# Patient Record
Sex: FEMALE | Race: WHITE | Employment: UNEMPLOYED | URBAN - METROPOLITAN AREA
[De-identification: names, ages, dates, MRNs, and addresses within clinical notes are randomized per-mention and may not be internally consistent; named-entity substitution may affect disease eponyms.]

---

## 2022-01-12 ENCOUNTER — HOSPITAL ENCOUNTER (INPATIENT)
Age: 72
LOS: 1 days | Discharge: HOME | DRG: 247 | End: 2022-01-13
Attending: EMERGENCY MEDICINE | Admitting: INTERNAL MEDICINE
Payer: MEDICARE

## 2022-01-12 ENCOUNTER — APPOINTMENT (OUTPATIENT)
Dept: GENERAL RADIOLOGY | Age: 72
DRG: 247 | End: 2022-01-12
Payer: MEDICARE

## 2022-01-12 DIAGNOSIS — F29 PSYCHOSIS, UNSPECIFIED PSYCHOSIS TYPE (HCC): ICD-10-CM

## 2022-01-12 DIAGNOSIS — I21.11 ST ELEVATION MYOCARDIAL INFARCTION INVOLVING RIGHT CORONARY ARTERY (HCC): Primary | ICD-10-CM

## 2022-01-12 DIAGNOSIS — U07.1 COVID-19: ICD-10-CM

## 2022-01-12 LAB
ALBUMIN SERPL-MCNC: 3.4 G/DL (ref 3.5–4.6)
ALP BLD-CCNC: 70 U/L (ref 40–130)
ALT SERPL-CCNC: 29 U/L (ref 0–33)
ANION GAP SERPL CALCULATED.3IONS-SCNC: 11 MEQ/L (ref 9–15)
AST SERPL-CCNC: 37 U/L (ref 0–35)
BASOPHILS ABSOLUTE: 0.1 K/UL (ref 0–0.2)
BASOPHILS RELATIVE PERCENT: 0.5 %
BILIRUB SERPL-MCNC: 0.5 MG/DL (ref 0.2–0.7)
BUN BLDV-MCNC: 15 MG/DL (ref 8–23)
CALCIUM SERPL-MCNC: 8.6 MG/DL (ref 8.5–9.9)
CHLORIDE BLD-SCNC: 102 MEQ/L (ref 95–107)
CHOLESTEROL, TOTAL: 155 MG/DL (ref 0–199)
CO2: 23 MEQ/L (ref 20–31)
CREAT SERPL-MCNC: 0.71 MG/DL (ref 0.5–0.9)
EOSINOPHILS ABSOLUTE: 0.5 K/UL (ref 0–0.7)
EOSINOPHILS RELATIVE PERCENT: 3.2 %
GFR AFRICAN AMERICAN: >60
GFR AFRICAN AMERICAN: >60
GFR NON-AFRICAN AMERICAN: >60
GFR NON-AFRICAN AMERICAN: >60
GLOBULIN: 3 G/DL (ref 2.3–3.5)
GLUCOSE BLD-MCNC: 126 MG/DL (ref 70–99)
HCT VFR BLD CALC: 38.7 % (ref 37–47)
HDLC SERPL-MCNC: 42 MG/DL (ref 40–59)
HEMOGLOBIN: 12.8 G/DL (ref 12–16)
INR BLD: 1.1
LDL CHOLESTEROL CALCULATED: 86 MG/DL (ref 0–129)
LV EF: 55 %
LVEF MODALITY: NORMAL
LYMPHOCYTES ABSOLUTE: 2.5 K/UL (ref 1–4.8)
LYMPHOCYTES RELATIVE PERCENT: 15.9 %
MCH RBC QN AUTO: 32 PG (ref 27–31.3)
MCHC RBC AUTO-ENTMCNC: 33 % (ref 33–37)
MCV RBC AUTO: 96.9 FL (ref 82–100)
MONOCYTES ABSOLUTE: 1.2 K/UL (ref 0.2–0.8)
MONOCYTES RELATIVE PERCENT: 7.8 %
NEUTROPHILS ABSOLUTE: 11.6 K/UL (ref 1.4–6.5)
NEUTROPHILS RELATIVE PERCENT: 72.6 %
PDW BLD-RTO: 13.4 % (ref 11.5–14.5)
PERFORMED ON: NORMAL
PLATELET # BLD: 362 K/UL (ref 130–400)
POC CREATININE WHOLE BLOOD: 0.8
POC CREATININE: 0.8 MG/DL (ref 0.6–1.2)
POC SAMPLE TYPE: NORMAL
POTASSIUM SERPL-SCNC: 4.3 MEQ/L (ref 3.4–4.9)
PROTHROMBIN TIME: 14.5 SEC (ref 12.3–14.9)
RBC # BLD: 4 M/UL (ref 4.2–5.4)
SARS-COV-2, NAAT: DETECTED
SODIUM BLD-SCNC: 136 MEQ/L (ref 135–144)
TOTAL PROTEIN: 6.4 G/DL (ref 6.3–8)
TRIGL SERPL-MCNC: 134 MG/DL (ref 0–150)
TROPONIN: 0.14 NG/ML (ref 0–0.01)
TROPONIN: <0.01 NG/ML (ref 0–0.01)
WBC # BLD: 16 K/UL (ref 4.8–10.8)

## 2022-01-12 PROCEDURE — 4A023N7 MEASUREMENT OF CARDIAC SAMPLING AND PRESSURE, LEFT HEART, PERCUTANEOUS APPROACH: ICD-10-PCS | Performed by: INTERNAL MEDICINE

## 2022-01-12 PROCEDURE — 87635 SARS-COV-2 COVID-19 AMP PRB: CPT

## 2022-01-12 PROCEDURE — 80061 LIPID PANEL: CPT

## 2022-01-12 PROCEDURE — 85610 PROTHROMBIN TIME: CPT

## 2022-01-12 PROCEDURE — 2580000003 HC RX 258

## 2022-01-12 PROCEDURE — 84484 ASSAY OF TROPONIN QUANT: CPT

## 2022-01-12 PROCEDURE — 85025 COMPLETE CBC W/AUTO DIFF WBC: CPT

## 2022-01-12 PROCEDURE — 92941 PRQ TRLML REVSC TOT OCCL AMI: CPT | Performed by: INTERNAL MEDICINE

## 2022-01-12 PROCEDURE — 2580000003 HC RX 258: Performed by: EMERGENCY MEDICINE

## 2022-01-12 PROCEDURE — 2580000003 HC RX 258: Performed by: INTERNAL MEDICINE

## 2022-01-12 PROCEDURE — 2500000003 HC RX 250 WO HCPCS

## 2022-01-12 PROCEDURE — 92941 PRQ TRLML REVSC TOT OCCL AMI: CPT

## 2022-01-12 PROCEDURE — 82565 ASSAY OF CREATININE: CPT

## 2022-01-12 PROCEDURE — 71045 X-RAY EXAM CHEST 1 VIEW: CPT

## 2022-01-12 PROCEDURE — 93458 L HRT ARTERY/VENTRICLE ANGIO: CPT

## 2022-01-12 PROCEDURE — 6360000002 HC RX W HCPCS

## 2022-01-12 PROCEDURE — 85347 COAGULATION TIME ACTIVATED: CPT

## 2022-01-12 PROCEDURE — 6370000000 HC RX 637 (ALT 250 FOR IP): Performed by: INTERNAL MEDICINE

## 2022-01-12 PROCEDURE — 2000000000 HC ICU R&B

## 2022-01-12 PROCEDURE — 93458 L HRT ARTERY/VENTRICLE ANGIO: CPT | Performed by: INTERNAL MEDICINE

## 2022-01-12 PROCEDURE — 6370000000 HC RX 637 (ALT 250 FOR IP)

## 2022-01-12 PROCEDURE — 6360000004 HC RX CONTRAST MEDICATION: Performed by: INTERNAL MEDICINE

## 2022-01-12 PROCEDURE — 99223 1ST HOSP IP/OBS HIGH 75: CPT | Performed by: INTERNAL MEDICINE

## 2022-01-12 PROCEDURE — 6360000002 HC RX W HCPCS: Performed by: EMERGENCY MEDICINE

## 2022-01-12 PROCEDURE — 93005 ELECTROCARDIOGRAM TRACING: CPT | Performed by: INTERNAL MEDICINE

## 2022-01-12 PROCEDURE — 027034Z DILATION OF CORONARY ARTERY, ONE ARTERY WITH DRUG-ELUTING INTRALUMINAL DEVICE, PERCUTANEOUS APPROACH: ICD-10-PCS | Performed by: INTERNAL MEDICINE

## 2022-01-12 PROCEDURE — 96374 THER/PROPH/DIAG INJ IV PUSH: CPT

## 2022-01-12 PROCEDURE — 80053 COMPREHEN METABOLIC PANEL: CPT

## 2022-01-12 PROCEDURE — 99285 EMERGENCY DEPT VISIT HI MDM: CPT

## 2022-01-12 PROCEDURE — B2111ZZ FLUOROSCOPY OF MULTIPLE CORONARY ARTERIES USING LOW OSMOLAR CONTRAST: ICD-10-PCS | Performed by: INTERNAL MEDICINE

## 2022-01-12 PROCEDURE — 36415 COLL VENOUS BLD VENIPUNCTURE: CPT

## 2022-01-12 RX ORDER — SODIUM CHLORIDE 9 MG/ML
INJECTION, SOLUTION INTRAVENOUS CONTINUOUS
Status: DISCONTINUED | OUTPATIENT
Start: 2022-01-12 | End: 2022-01-13 | Stop reason: HOSPADM

## 2022-01-12 RX ORDER — MIDAZOLAM HYDROCHLORIDE 2 MG/2ML
2 INJECTION, SOLUTION INTRAMUSCULAR; INTRAVENOUS
Status: ACTIVE | OUTPATIENT
Start: 2022-01-12 | End: 2022-01-12

## 2022-01-12 RX ORDER — ASPIRIN 81 MG/1
81 TABLET ORAL DAILY
Status: DISCONTINUED | OUTPATIENT
Start: 2022-01-13 | End: 2022-01-13 | Stop reason: HOSPADM

## 2022-01-12 RX ORDER — ATORVASTATIN CALCIUM 40 MG/1
40 TABLET, FILM COATED ORAL NIGHTLY
Status: DISCONTINUED | OUTPATIENT
Start: 2022-01-12 | End: 2022-01-13 | Stop reason: HOSPADM

## 2022-01-12 RX ORDER — METOPROLOL TARTRATE 50 MG/1
50 TABLET, FILM COATED ORAL 2 TIMES DAILY
Status: DISCONTINUED | OUTPATIENT
Start: 2022-01-12 | End: 2022-01-13 | Stop reason: HOSPADM

## 2022-01-12 RX ORDER — PANTOPRAZOLE SODIUM 40 MG/1
40 TABLET, DELAYED RELEASE ORAL
Status: DISCONTINUED | OUTPATIENT
Start: 2022-01-13 | End: 2022-01-13 | Stop reason: HOSPADM

## 2022-01-12 RX ORDER — FENTANYL CITRATE 50 UG/ML
25 INJECTION, SOLUTION INTRAMUSCULAR; INTRAVENOUS
Status: ACTIVE | OUTPATIENT
Start: 2022-01-12 | End: 2022-01-12

## 2022-01-12 RX ORDER — ONDANSETRON 2 MG/ML
4 INJECTION INTRAMUSCULAR; INTRAVENOUS EVERY 6 HOURS PRN
Status: DISCONTINUED | OUTPATIENT
Start: 2022-01-12 | End: 2022-01-13 | Stop reason: HOSPADM

## 2022-01-12 RX ORDER — HYDRALAZINE HYDROCHLORIDE 20 MG/ML
10 INJECTION INTRAMUSCULAR; INTRAVENOUS EVERY 10 MIN PRN
Status: DISCONTINUED | OUTPATIENT
Start: 2022-01-12 | End: 2022-01-13 | Stop reason: HOSPADM

## 2022-01-12 RX ORDER — SODIUM CHLORIDE 9 MG/ML
INJECTION, SOLUTION INTRAVENOUS CONTINUOUS
Status: DISCONTINUED | OUTPATIENT
Start: 2022-01-12 | End: 2022-01-12

## 2022-01-12 RX ORDER — ACETAMINOPHEN 325 MG/1
650 TABLET ORAL EVERY 4 HOURS PRN
Status: DISCONTINUED | OUTPATIENT
Start: 2022-01-12 | End: 2022-01-13 | Stop reason: HOSPADM

## 2022-01-12 RX ORDER — ONDANSETRON 2 MG/ML
INJECTION INTRAMUSCULAR; INTRAVENOUS
Status: DISPENSED
Start: 2022-01-12 | End: 2022-01-13

## 2022-01-12 RX ORDER — ONDANSETRON 2 MG/ML
4 INJECTION INTRAMUSCULAR; INTRAVENOUS ONCE
Status: COMPLETED | OUTPATIENT
Start: 2022-01-12 | End: 2022-01-12

## 2022-01-12 RX ORDER — MORPHINE SULFATE 2 MG/ML
2 INJECTION, SOLUTION INTRAMUSCULAR; INTRAVENOUS
Status: ACTIVE | OUTPATIENT
Start: 2022-01-12 | End: 2022-01-12

## 2022-01-12 RX ORDER — LABETALOL HYDROCHLORIDE 5 MG/ML
10 INJECTION, SOLUTION INTRAVENOUS EVERY 30 MIN PRN
Status: DISCONTINUED | OUTPATIENT
Start: 2022-01-12 | End: 2022-01-13 | Stop reason: HOSPADM

## 2022-01-12 RX ORDER — ESCITALOPRAM OXALATE 10 MG/1
10 TABLET ORAL DAILY
Status: DISCONTINUED | OUTPATIENT
Start: 2022-01-12 | End: 2022-01-13 | Stop reason: HOSPADM

## 2022-01-12 RX ORDER — FENTANYL CITRATE 50 UG/ML
25 INJECTION, SOLUTION INTRAMUSCULAR; INTRAVENOUS ONCE
Status: DISCONTINUED | OUTPATIENT
Start: 2022-01-12 | End: 2022-01-12 | Stop reason: HOSPADM

## 2022-01-12 RX ADMIN — ONDANSETRON 4 MG: 2 INJECTION INTRAMUSCULAR; INTRAVENOUS at 18:31

## 2022-01-12 RX ADMIN — METOPROLOL TARTRATE 50 MG: 50 TABLET, FILM COATED ORAL at 20:50

## 2022-01-12 RX ADMIN — ATORVASTATIN CALCIUM 40 MG: 40 TABLET, FILM COATED ORAL at 20:50

## 2022-01-12 RX ADMIN — TICAGRELOR 180 MG: 90 TABLET ORAL at 18:22

## 2022-01-12 RX ADMIN — SODIUM CHLORIDE: 9 INJECTION, SOLUTION INTRAVENOUS at 18:30

## 2022-01-12 RX ADMIN — SODIUM CHLORIDE: 9 INJECTION, SOLUTION INTRAVENOUS at 20:30

## 2022-01-12 ASSESSMENT — PAIN SCALES - GENERAL
PAINLEVEL_OUTOF10: 0
PAINLEVEL_OUTOF10: 0

## 2022-01-12 ASSESSMENT — ENCOUNTER SYMPTOMS
VOMITING: 0
SORE THROAT: 0
NAUSEA: 1
WHEEZING: 0
CHEST TIGHTNESS: 0
ABDOMINAL PAIN: 0
SHORTNESS OF BREATH: 0
EYE PAIN: 0
SHORTNESS OF BREATH: 1
NAUSEA: 0
COUGH: 0
EYES NEGATIVE: 1
ALLERGIC/IMMUNOLOGIC NEGATIVE: 1

## 2022-01-12 NOTE — ED NOTES
Defibrillator applied, pants removed. Patient alert and talking. Skin pink, warm, and dry. Respirations equal and unlabored.       Lena Marks RN  01/12/22 0311

## 2022-01-12 NOTE — ED NOTES
Patient ready for cath lab, awaiting okay from cath lab to transport patient.       Dg Hdez RN  01/12/22 400 Mian Butterfield RN  01/12/22 400 Mian Butterfield RN  01/12/22 0064

## 2022-01-12 NOTE — ED NOTES
EMS gave patient zofran 4mg, asa 324mg, heparin 4000 units PTA.      Eliza Nelson, RN  01/12/22 1444

## 2022-01-12 NOTE — ED PROVIDER NOTES
[address] [facility] ED  EMERGENCY DEPARTMENT ENCOUNTER      Pt Name: Beata Roman  MRN: [id number]  [place] 1950  Date of evaluation: 1/12/2022  Provider: Dominic Anthony DO    CHIEF COMPLAINT       Chief Complaint   Patient presents with    Chest Pain         HISTORY OF PRESENT ILLNESS   (Location/Symptom, Timing/Onset, Context/Setting, Quality, Duration, Modifying Factors, Severity)  Note limiting factors. Beata Roman is a 66 y.o. female who presents to the emergency department . Patient comes in with 1 hour of chest pain. EMS called. EKG shows ST elevation inferior MI. Patient was given heparin 4000 units and aspirin 325 mg. They did not have Brilinta on the ambulance and therefore it was not given. They could not give morphine because blood pressure was somewhat low. They did give some fluids and systolic blood pressure came up to 120. Patient tested positive for COVID 8 days ago. She has not been complaining of shortness of breath. Patient has history of recent carotid artery surgery. Also has history of breast cancer with mastectomy, GERD hypercholesterolemia hypertension. HPI    Nursing Notes were reviewed. REVIEW OF SYSTEMS    (2-9 systems for level 4, 10 or more for level 5)     Review of Systems   Constitutional: Negative for activity change, appetite change, fatigue and fever. HENT: Negative for congestion and sore throat. Eyes: Negative for pain and visual disturbance. Respiratory: Negative for cough, chest tightness and shortness of breath. Cardiovascular: Positive for chest pain. Gastrointestinal: Negative for abdominal pain, nausea and vomiting. Endocrine: Negative for polydipsia. Genitourinary: Negative for flank pain and urgency. Musculoskeletal: Negative for gait problem and neck stiffness. Skin: Negative for rash. Neurological: Negative for weakness, light-headedness and headaches.    Psychiatric/Behavioral: Negative for confusion and sleep disturbance. Except as noted above the remainder of the review of systems was reviewed and negative.        PAST MEDICAL HISTORY     Past Medical History:   Diagnosis Date    Anxiety     Breast cancer ([address]     Depression     Gallbladder disease     cholecystitis    GERD (gastroesophageal reflux disease)     Hypercholesterolemia     Hyperlipidemia     Hypertension          SURGICAL HISTORY       Past Surgical History:   Procedure Laterality Date    BREAST BIOPSY Left 1999    benign    BREAST BIOPSY Right 2004    rash     BREAST BIOPSY Right 2013    Right breast biopsy with biopsy of right breast skin    BREAST SURGERY Right 13    Right simple mastectomy with SNB    CHOLECYSTECTOMY  2000    MASTECTOMY Right 2013    with axillary sentinel lymph node Bx     OTHER SURGICAL HISTORY  11/10/14    MULT MASSES EXC CHEST WALL         CURRENT MEDICATIONS       Previous Medications    DILTIAZEM (CARDIZEM CD) 240 MG ER CAPSULE        ESCITALOPRAM (LEXAPRO) 10 MG TABLET        FLUTICASONE (FLONASE) 50 MCG/ACT NASAL SPRAY        PANTOPRAZOLE (PROTONIX) 40 MG TABLET        PRAVASTATIN (PRAVACHOL) 80 MG TABLET    Take 80 mg by mouth nightly        ALLERGIES     Amoxicillin    FAMILY HISTORY       Family History   Problem Relation Age of Onset    Cancer Mother         Melanoma          SOCIAL HISTORY       Social History     Socioeconomic History    Marital status:      Spouse name: Not on file    Number of children: Not on file    Years of education: Not on file    Highest education level: Not on file   Occupational History    Not on file   Tobacco Use    Smoking status: Former Smoker     Packs/day: 1.00     Years: 30.00     Pack years: 30.00     Types: Cigarettes     Quit date: 1996     Years since quittin.0    Smokeless tobacco: Never Used   Substance and Sexual Activity    Alcohol use: Never    Drug use: Not on file    Sexual activity: Not on file   Other Topics Concern    Not on file   Social History Narrative    Not on file     Social Determinants of Health     Financial Resource Strain:     Difficulty of Paying Living Expenses: Not on file   Food Insecurity:     Worried About Running Out of Food in the Last Year: Not on file    Darek of Food in the Last Year: Not on file   Transportation Needs:     Lack of Transportation (Medical): Not on file    Lack of Transportation (Non-Medical): Not on file   Physical Activity:     Days of Exercise per Week: Not on file    Minutes of Exercise per Session: Not on file   Stress:     Feeling of Stress : Not on file   Social Connections:     Frequency of Communication with Friends and Family: Not on file    Frequency of Social Gatherings with Friends and Family: Not on file    Attends Ca[employer] Services: Not on file    Active Member of [address] [place] [address] or Organizations: Not on file    Attends Club or Organization Meetings: Not on file    Marital Status: Not on file   Intimate Partner Violence:     Fear of Current or Ex-Partner: Not on file    Emotionally Abused: Not on file    Physically Abused: Not on file    Sexually Abused: Not on file   Housing Stability:     Unable to Pay for Housing in the Last Year: Not on file    Number of [place] in the Last Year: Not on file    Unstable Housing in the Last Year: Not on file       SCREENINGS                        PHYSICAL EXAM    (up to 7 for level 4, 8 or more for level 5)     ED Triage Vitals   BP Temp Temp src Pulse Resp SpO2 Height Weight   -- -- -- -- -- -- -- --       Physical Exam  Vitals and nursing note reviewed. Constitutional:       General: She is not in acute distress. Appearance: She is well-developed. She is not ill-appearing or diaphoretic. HENT:      Head: Normocephalic and atraumatic.       Right Ear: External ear normal.      Left Ear: External ear normal.      Nose: Nose normal.      Mouth/Throat:      Mouth: Mucous membranes are moist. Pharynx: No oropharyngeal exudate. Eyes:      Conjunctiva/sclera: Conjunctivae normal.      Pupils: Pupils are equal, round, and reactive to light. Neck:      Thyroid: No thyromegaly. Vascular: No JVD. Trachea: No tracheal deviation. Cardiovascular:      Rate and Rhythm: Normal rate and regular rhythm. Heart sounds: Normal heart sounds. No murmur heard. Pulmonary:      Effort: Pulmonary effort is normal. No respiratory distress. Breath sounds: Normal breath sounds. No wheezing. Abdominal:      General: Bowel sounds are normal.      Palpations: Abdomen is soft. Tenderness: There is no abdominal tenderness. There is no guarding. Musculoskeletal:         General: Normal range of motion. Cervical back: Normal range of motion and neck supple. Right lower leg: No edema. Left lower leg: No edema. Skin:     General: Skin is warm and dry. Findings: No rash. Neurological:      General: No focal deficit present. Mental Status: She is alert and oriented to person, place, and time. Cranial Nerves: No cranial nerve deficit. Psychiatric:         Behavior: Behavior normal.         DIAGNOSTIC RESULTS     EKG: All EKG's are interpreted by the Emergency Department Physician who either signs or Co-signs this chart in the absence of a cardiologist.    Sinus rhythm 71 bpm with first-degree AV block.   ST elevation inferiorly    RADIOLOGY:   Non-plain film images such as CT, Ultrasound and MRI are read by the radiologist. Plain radiographic images are visualized and preliminarily interpreted by the emergency physician with the below findings:    Chest xray: no acute disease    Interpretation per the Radiologist below, if available at the time of this note:    No orders to display         ED BEDSIDE ULTRASOUND:   Performed by ED Physician - none    LABS:  Labs Reviewed   COVID-19, RAPID   CBC WITH AUTO DIFFERENTIAL   COMPREHENSIVE METABOLIC PANEL   [address] TROPONIN   LIPID PANEL       All other labs were within normal range or not returned as of this dictation. EMERGENCY DEPARTMENT COURSE and DIFFERENTIAL DIAGNOSIS/MDM:   Vitals: There were no vitals filed for this visit. Patient here with ST elevation MI. Patient given heparin aspirin Brilinta IV fluids and transferred to the Cath Lab. Dr. Brennan Cockayne in attendance. MDM      REASSESSMENT          CRITICAL CARE TIME   Total Critical Care time was 30 minutes, excluding separately reportable procedures. There was a high probability of clinically significant/life threatening deterioration in the patient's condition which required my urgent intervention. CONSULTS:  None    PROCEDURES:  Unless otherwise noted below, none     Procedures        FINAL IMPRESSION      1. ST elevation myocardial infarction involving right coronary artery ([facility] [address]    2. Psychosis, unspecified psychosis type ([facility] [address]    3. COVID-19          DISPOSITION/PLAN   DISPOSITION  cath lab      PATIENT REFERRED TO:  No follow-up provider specified. DISCHARGE MEDICATIONS:  New Prescriptions    No medications on file     Controlled Substances Monitoring:     No flowsheet data found.     (Please note that portions of this note were completed with a voice recognition program.  Efforts were made to edit the dictations but occasionally words are mis-transcribed.)    Radha Whitley DO (electronically signed)  Attending Emergency Physician           Radha Whitley DO  01/12/22 [id number]

## 2022-01-13 VITALS
TEMPERATURE: 98.3 F | RESPIRATION RATE: 14 BRPM | WEIGHT: 160 LBS | DIASTOLIC BLOOD PRESSURE: 42 MMHG | HEIGHT: 60 IN | OXYGEN SATURATION: 98 % | HEART RATE: 78 BPM | BODY MASS INDEX: 31.41 KG/M2 | SYSTOLIC BLOOD PRESSURE: 164 MMHG

## 2022-01-13 LAB
ANION GAP SERPL CALCULATED.3IONS-SCNC: 12 MEQ/L (ref 9–15)
BUN BLDV-MCNC: 9 MG/DL (ref 8–23)
CALCIUM SERPL-MCNC: 8.5 MG/DL (ref 8.5–9.9)
CHLORIDE BLD-SCNC: 106 MEQ/L (ref 95–107)
CHOLESTEROL, TOTAL: 137 MG/DL (ref 0–199)
CO2: 20 MEQ/L (ref 20–31)
CREAT SERPL-MCNC: 0.52 MG/DL (ref 0.5–0.9)
EKG ATRIAL RATE: 93 BPM
EKG P AXIS: 64 DEGREES
EKG P-R INTERVAL: 214 MS
EKG Q-T INTERVAL: 378 MS
EKG QRS DURATION: 86 MS
EKG QTC CALCULATION (BAZETT): 469 MS
EKG R AXIS: 52 DEGREES
EKG T AXIS: 47 DEGREES
EKG VENTRICULAR RATE: 93 BPM
GFR AFRICAN AMERICAN: >60
GFR NON-AFRICAN AMERICAN: >60
GLUCOSE BLD-MCNC: 93 MG/DL (ref 70–99)
HCT VFR BLD CALC: 39.8 % (ref 37–47)
HDLC SERPL-MCNC: 40 MG/DL (ref 40–59)
HEMOGLOBIN: 13.1 G/DL (ref 12–16)
LDL CHOLESTEROL CALCULATED: 68 MG/DL (ref 0–129)
MCH RBC QN AUTO: 32.3 PG (ref 27–31.3)
MCHC RBC AUTO-ENTMCNC: 33 % (ref 33–37)
MCV RBC AUTO: 97.9 FL (ref 82–100)
PDW BLD-RTO: 13.7 % (ref 11.5–14.5)
PERFORMED ON: ABNORMAL
PLATELET # BLD: 323 K/UL (ref 130–400)
POC ACTIVATED CLOTTING TIME KAOLIN: 344 SEC (ref 82–152)
POC SAMPLE TYPE: ABNORMAL
POTASSIUM SERPL-SCNC: 3.5 MEQ/L (ref 3.4–4.9)
RBC # BLD: 4.07 M/UL (ref 4.2–5.4)
SODIUM BLD-SCNC: 138 MEQ/L (ref 135–144)
TRIGL SERPL-MCNC: 145 MG/DL (ref 0–150)
TROPONIN: 0.66 NG/ML (ref 0–0.01)
WBC # BLD: 11.9 K/UL (ref 4.8–10.8)

## 2022-01-13 PROCEDURE — 85027 COMPLETE CBC AUTOMATED: CPT

## 2022-01-13 PROCEDURE — 84484 ASSAY OF TROPONIN QUANT: CPT

## 2022-01-13 PROCEDURE — 36415 COLL VENOUS BLD VENIPUNCTURE: CPT

## 2022-01-13 PROCEDURE — 80048 BASIC METABOLIC PNL TOTAL CA: CPT

## 2022-01-13 PROCEDURE — 99239 HOSP IP/OBS DSCHRG MGMT >30: CPT | Performed by: INTERNAL MEDICINE

## 2022-01-13 PROCEDURE — 80061 LIPID PANEL: CPT

## 2022-01-13 PROCEDURE — 6370000000 HC RX 637 (ALT 250 FOR IP): Performed by: INTERNAL MEDICINE

## 2022-01-13 RX ORDER — LOSARTAN POTASSIUM 25 MG/1
25 TABLET ORAL DAILY
Qty: 30 TABLET | Refills: 5 | Status: SHIPPED | OUTPATIENT
Start: 2022-01-13 | End: 2022-01-27 | Stop reason: SDUPTHER

## 2022-01-13 RX ORDER — METOPROLOL TARTRATE 50 MG/1
50 TABLET, FILM COATED ORAL 2 TIMES DAILY
Qty: 60 TABLET | Refills: 3 | OUTPATIENT
Start: 2022-01-13 | End: 2022-02-05

## 2022-01-13 RX ORDER — ASPIRIN 81 MG/1
81 TABLET ORAL DAILY
Qty: 30 TABLET | Refills: 3 | COMMUNITY
Start: 2022-01-13

## 2022-01-13 RX ORDER — ATORVASTATIN CALCIUM 40 MG/1
40 TABLET, FILM COATED ORAL NIGHTLY
Qty: 30 TABLET | Refills: 3 | Status: SHIPPED | OUTPATIENT
Start: 2022-01-13 | End: 2022-01-20

## 2022-01-13 RX ADMIN — PANTOPRAZOLE SODIUM 40 MG: 40 TABLET, DELAYED RELEASE ORAL at 06:37

## 2022-01-13 RX ADMIN — METOPROLOL TARTRATE 50 MG: 50 TABLET, FILM COATED ORAL at 10:00

## 2022-01-13 RX ADMIN — Medication 650 MG: at 02:04

## 2022-01-13 RX ADMIN — TICAGRELOR 90 MG: 90 TABLET ORAL at 06:37

## 2022-01-13 RX ADMIN — ASPIRIN 81 MG: 81 TABLET, COATED ORAL at 09:59

## 2022-01-13 ASSESSMENT — PAIN SCALES - GENERAL
PAINLEVEL_OUTOF10: 0
PAINLEVEL_OUTOF10: 5
PAINLEVEL_OUTOF10: 0
PAINLEVEL_OUTOF10: 0

## 2022-01-13 ASSESSMENT — PAIN DESCRIPTION - LOCATION: LOCATION: GENERALIZED

## 2022-01-13 ASSESSMENT — PAIN DESCRIPTION - DESCRIPTORS: DESCRIPTORS: DISCOMFORT

## 2022-01-13 ASSESSMENT — PAIN DESCRIPTION - PAIN TYPE: TYPE: CHRONIC PAIN

## 2022-01-13 NOTE — PLAN OF CARE
Problem: Airway Clearance - Ineffective  Goal: Achieve or maintain patent airway  Outcome: Ongoing     Problem: Gas Exchange - Impaired  Goal: Absence of hypoxia  Outcome: Ongoing  Goal: Promote optimal lung function  Outcome: Ongoing     Problem: Breathing Pattern - Ineffective  Goal: Ability to achieve and maintain a regular respiratory rate  Outcome: Ongoing     Problem:  Body Temperature -  Risk of, Imbalanced  Goal: Ability to maintain a body temperature within defined limits  Outcome: Ongoing  Goal: Will regain or maintain usual level of consciousness  Outcome: Ongoing  Goal: Complications related to the disease process, condition or treatment will be avoided or minimized  Outcome: Ongoing     Problem: Isolation Precautions - Risk of Spread of Infection  Goal: Prevent transmission of infection  Outcome: Ongoing     Problem: Nutrition Deficits  Goal: Optimize nutritional status  Outcome: Ongoing     Problem: Risk for Fluid Volume Deficit  Goal: Maintain normal heart rhythm  Outcome: Ongoing  Goal: Maintain absence of muscle cramping  Outcome: Ongoing  Goal: Maintain normal serum potassium, sodium, calcium, phosphorus, and pH  Outcome: Ongoing     Problem: Loneliness or Risk for Loneliness  Goal: Demonstrate positive use of time alone when socialization is not possible  Outcome: Ongoing     Problem: Fatigue  Goal: Verbalize increase energy and improved vitality  Outcome: Ongoing     Problem: Patient Education: Go to Patient Education Activity  Goal: Patient/Family Education  Outcome: Ongoing     Problem: Falls - Risk of:  Goal: Will remain free from falls  Description: Will remain free from falls  Outcome: Ongoing  Goal: Absence of physical injury  Description: Absence of physical injury  Outcome: Ongoing     Problem: Discharge Planning:  Goal: Discharged to appropriate level of care  Description: Discharged to appropriate level of care  Outcome: Ongoing     Problem: Pain - Acute:  Goal: Pain level will decrease  Description: Pain level will decrease  Outcome: Ongoing     Problem: Anxiety:  Goal: Level of anxiety will decrease  Description: Level of anxiety will decrease  Outcome: Ongoing     Problem: Cardiac Output - Decreased:  Goal: Cardiac output within specified parameters  Description: Cardiac output within specified parameters  Outcome: Ongoing     Problem: Serum Glucose Level - Abnormal:  Goal: Ability to maintain appropriate glucose levels will improve  Description: Ability to maintain appropriate glucose levels will improve  Outcome: Ongoing     Problem: Tissue Perfusion - Cardiopulmonary, Altered:  Goal: Circulatory function within specified parameters  Description: Circulatory function within specified parameters  Outcome: Ongoing  Goal: Absence of angina  Description: Absence of angina  Outcome: Ongoing  Goal: Hemodynamic stability will improve  Description: Hemodynamic stability will improve  Outcome: Ongoing     Problem: Tissue Perfusion - Peripheral, Altered:  Goal: Absence of hematoma at arterial access site  Description: Absence of hematoma at arterial access site  Outcome: Ongoing  Goal: Circulatory function of lower extremities is within specified parameters  Description: Circulatory function of lower extremities is within specified parameters  Outcome: Ongoing     Problem: Tissue Perfusion - Renal, Altered:  Goal: Electrolytes within specified parameters  Description: Electrolytes within specified parameters  Outcome: Ongoing  Goal: Urine creatinine clearance will be within specified parameters  Description: Urine creatinine clearance will be within specified parameters  Outcome: Ongoing  Goal: Serum creatinine will be within specified parameters  Description: Serum creatinine will be within specified parameters  Outcome: Ongoing  Goal: Ability to achieve a balanced intake and output will improve  Description: Ability to achieve a balanced intake and output will improve  Outcome: Ongoing

## 2022-01-13 NOTE — DISCHARGE SUMMARY
results should be treated as presumptive and,if inconsistent with clinical signs and symptoms or necessary forpatient management, should be tested with an alternative molecularassay. Negative results do not preclude SARS-CoV-2 infection andshould not be used as the sole basis for patient management decisions. This test has been authorized by the FDA under an Emergency UseAuthorization (EUA) for use by authorized laboratories. Fact sheet for [employer] sheet for Patients: DaveyMusadk: Isothermal Nucleic Acid AmplificationWBC                                           Date: 01/12/2022Value: 16.0*       Ref range: 4.8 - 10.8 K/uL    Status: FinalRBC                                           Date: 01/12/2022Value: 4.00*       Ref range: 4.20 - 5.40 M/uL   Status: FinalHemoglobin                                    Date: 01/12/2022Value: 12.8        Ref range: 12.0 - 16.0 g/dL   Status: FinalHematocrit                                    Date: 01/12/2022Value: 38.7        Ref range: 37.0 - 47.0 %      Status: FinalMCV                                           Date: 01/12/2022Value: 96.9        Ref range: 82.0 - 100.0 fL    Status: [address]                                           Date: 01/12/2022Value: 32.0*       Ref range: 27.0 - 31.3 pg     Status: [address]                                          Date: 01/12/2022Value: 33.0        Ref range: 33.0 - 37.0 %      Status: FinalRDW                                           Date: 01/12/2022Value: 13.4        Ref range: 11.5 - 14.5 %      Status: FinalPlatelets                                     Date: 01/12/2022Value: 362         Ref range: 130 - 400 K/uL     Status: FinalNeutrophils %                                 Date: 01/12/2022Value: 72.6        Ref range: %                  Status: FinalLymphocytes %                                 Date: 01/12/2022Value: 15.9        Ref range: %                  Status: FinalMonocytes %                                   Date: 01/12/2022Value: 7.8         Ref range: %                  Status: FinalEosinophils %                                 Date: 01/12/2022Value: 3.2         Ref range: %                  Status: FinalBasophils %                                   Date: 01/12/2022Value: 0.5         Ref range: %                  Status: FinalNeutrophils Absolute                          Date: 01/12/2022Value: 11.6*       Ref range: 1.4 - 6.5 K/uL     Status: FinalLymphocytes Absolute                          Date: 01/12/2022Value: 2.5         Ref range: 1.0 - 4.8 K/uL     Status: FinalMonocytes Absolute                            Date: 01/12/2022Value: 1.2*        Ref range: 0.2 - 0.8 K/uL     Status: FinalEosinophils Absolute                          Date: 01/12/2022Value: 0.5         Ref range: 0.0 - 0.7 K/uL     Status: FinalBasophils Absolute                            Date: 01/12/2022Value: 0.1         Ref range: 0.0 - 0.2 K/uL     Status: FinalSodium                                        Date: 01/12/2022Value: 136         Ref range: 135 - 144 mEq/L    Status: FinalPotassium                                     Date: 01/12/2022Value: 4.3         Ref range: 3.4 - 4.9 mEq/L    Status: Final              Comment: Specimen hemolysis has exceeded the interference as definedby Roche. Value may be falsely increased. Suggestrecollection if clinically indicated. Chloride                                      Date: 01/12/2022Value: 102         Ref range: 95 - 107 mEq/L     Status: FinalCO2                                           Date: 01/12/2022Value: 23          Ref range: 20 - 31 mEq/L      Status: FinalAnion Gap                                     Date: 01/12/2022Value: 11          Ref range: 9 - 15 mEq/L       Status: FinalGlucose                                       Date: 01/12/2022Value: 126*        Ref range: 70 - 99 mg/dL      Status: Nate Rose Date: 01/12/2022Value: 15          Ref range: 8 - 23 mg/dL       Status: FinalCREATININE                                    Date: 01/12/2022Value: 0.71        Ref range: 0.50 - 0.90 mg/dL  Status: FinalGFR Non-                      Date: 01/12/2022Value: >60.0       Ref range: >60                Status: Final              Comment: >60 mL/min/1.73m2 EGFR, calc. for ages 25 and older using theMDRD formula (not corrected for weight), is valid for stablerenal function. GFR                           Date: 01/12/2022Value: >60.0       Ref range: >60                Status: Final              Comment: >60 mL/min/1.73m2 EGFR, calc. for ages 25 and older using theMDRD formula (not corrected for weight), is valid for stablerenal function. Calcium                                       Date: 01/12/2022Value: 8.6         Ref range: 8.5 - 9.9 mg/dL    Status: FinalTotal Protein                                 Date: 01/12/2022Value: 6.4         Ref range: 6.3 - 8.0 g/dL     Status: FinalAlbumin                                       Date: 01/12/2022Value: 3.4*        Ref range: 3.5 - 4.6 g/dL     Status: FinalTotal Bilirubin                               Date: 01/12/2022Value: 0.5         Ref range: 0.2 - 0.7 mg/dL    Status: FinalAlkaline Phosphatase                          Date: 01/12/2022Value: 70          Ref range: 40 - 130 U/L       Status: FinalALT                                           Date: 01/12/2022Value: 29          Ref range: 0 - 33 U/L         Status: Final              Comment: Specimen hemolysis has exceeded the interference as definedby Roche. Result may be affected. Suggest recollection ifclinically indicated. AST                                           Date: 01/12/2022Value: 37*         Ref range: 0 - 35 U/L         Status: Final              Comment: Specimen hemolysis has exceeded the interference as definedby Roche.   Value may be falsely increased. Suggestrecollection if clinically indicated. Globulin                                      Date: 01/12/2022Value: 3.0         Ref range: 2.3 - 3.5 g/dL     Status: FinalProtime                                       Date: 01/12/2022Value: 14.5        Ref range: 12.3 - 14.9 sec    Status: FinalINR                                           Date: 01/12/2022Value: 1.1           Status: FinalTroponin                                      Date: 01/12/2022Value: <0.010      Ref range: 0.000 - 0.010 ng*  Status: Final              Comment: Methodology by Troponin T. Cholesterol, Total                            Date: 01/12/2022Value: 155         Ref range: 0 - 199 mg/dL      Status: Final              Comment: ATP III Cholesterol classification is Desirable. Triglycerides                                 Date: 01/12/2022Value: 134         Ref range: 0 - 150 mg/dL      Status: Final              Comment: ATP III Triglycerides Classification is Normal.HDL                                           Date: 01/12/2022Value: 42          Ref range: 40 - 59 mg/dL      Status: Final              Comment: ATP III HDL Cholesterol Classification is Desirable. Expected Values:Males:    >55 = No Risk          35-55 = Moderate Risk          <35 = High RiskFemales:  >65 = No Risk          45-65 = Moderate Risk          <45 = High RiskNCEP Guidelines:   Third Report May 2001>59 = negative risk factor for CHD<40 = major risk factor for CHDLDL Calculated                                Date: 01/12/2022Value: 86          Ref range: 0 - 129 mg/dL      Status: Final              Comment: ATP III LDL Classification is Optimal.POC CREATININE WHOLE BLOOD                    Date: 01/12/2022Value: 0.8           Status: FinalPOC Creatinine                                Date: 01/12/2022Value: 0.8         Ref range: 0.6 - 1.2 mg/dL    Status: FinalGFR Non-                      Date: 01/12/2022Value: >60         Ref range: >60 Status: Final              Comment: >60 mL/min/1.73m2 EGFR, calc. for ages 25 and older using theMDRD formula (not corrected for weight), is valid for stablerenal function. GFR                           Date: 01/12/2022Value: >60         Ref range: >60                Status: Final              Comment: >60 mL/min/1.73m2 EGFR, calc. for ages 25 and older using theMDRD formula (not corrected for weight), is valid for stablerenal function. Sample Type                                   Date: 01/12/2022Value: MARCUS           Status: FinalPerformed on                                  Date: 01/12/2022Value: SEE BELOW     Status: Final              Comment: Performed on POCSodium                                        Date: 01/13/2022Value: 138         Ref range: 135 - 144 mEq/L    Status: FinalPotassium                                     Date: 01/13/2022Value: 3.5         Ref range: 3.4 - 4.9 mEq/L    Status: FinalChloride                                      Date: 01/13/2022Value: 106         Ref range: 95 - 107 mEq/L     Status: FinalCO2                                           Date: 01/13/2022Value: 20          Ref range: 20 - 31 mEq/L      Status: FinalAnion Gap                                     Date: 01/13/2022Value: 12          Ref range: 9 - 15 mEq/L       Status: FinalGlucose                                       Date: 01/13/2022Value: 93          Ref range: 70 - 99 mg/dL      Status: FinalBUN                                           Date: 01/13/2022Value: 9           Ref range: 8 - 23 mg/dL       Status: FinalCREATININE                                    Date: 01/13/2022Value: 0.52        Ref range: 0.50 - 0.90 mg/dL  Status: FinalGFR Non-                      Date: 01/13/2022Value: >60.0       Ref range: >60                Status: Final              Comment: >60 mL/min/1.73m2 EGFR, calc. for ages 25 and older using theMDRD formula (not corrected for weight), is valid for stablerenal function. GFR                           Date: 01/13/2022Value: >60.0       Ref range: >60                Status: Final              Comment: >60 mL/min/1.73m2 EGFR, calc. for ages 25 and older using theMDRD formula (not corrected for weight), is valid for stablerenal function. Calcium                                       Date: 01/13/2022Value: 8.5         Ref range: 8.5 - 9.9 mg/dL    Status: [facility]                                           Date: 01/13/2022Value: 11.9*       Ref range: 4.8 - 10.8 K/uL    Status: FinalRBC                                           Date: 01/13/2022Value: 4.07*       Ref range: 4.20 - 5.40 M/uL   Status: FinalHemoglobin                                    Date: 01/13/2022Value: 13.1        Ref range: 12.0 - 16.0 g/dL   Status: FinalHematocrit                                    Date: 01/13/2022Value: 39.8        Ref range: 37.0 - 47.0 %      Status: FinalMCV                                           Date: 01/13/2022Value: 97.9        Ref range: 82.0 - 100.0 fL    Status: [address]                                           Date: 01/13/2022Value: 32.3*       Ref range: 27.0 - 31.3 pg     Status: [address]                                          Date: 01/13/2022Value: 33.0        Ref range: 33.0 - 37.0 %      Status: FinalRDW                                           Date: 01/13/2022Value: 13.7        Ref range: 11.5 - 14.5 %      Status: FinalPlatelets                                     Date: 01/13/2022Value: 323         Ref range: 130 - 400 K/uL     Status: FinalTroponin                                      Date: 01/12/2022Value: 0.140*      Ref range: 0.000 - 0.010 ng*  Status: Final              Comment: Specimen hemolysis has exceeded the interference as definedby Roche. Value may be falsely decreased. Suggestrecollection if clinically indicated. Methodology by Troponin T. Troponin                                      Date: 01/13/2022Value: 0.656* Ref range: 0.000 - 0.010 ng*  Status: Final              Comment: Specimen hemolysis has exceeded the interference as definedby Roche. Value may be falsely decreased. Suggestrecollection if clinically indicated. Methodology by Troponin T. Ventricular Rate                              Date: 01/12/2022Value: 93          Ref range: BPM                Status: PreliminaryAtrial Rate                                   Date: 01/12/2022Value: 93          Ref range: BPM                Status: PreliminaryP-R Interval                                  Date: 01/12/2022Value: 214         Ref range: ms                 Status: PreliminaryQRS Duration                                  Date: 01/12/2022Value: 86          Ref range: ms                 Status: PreliminaryQ-T Interval                                  Date: 01/12/2022Value: 378         Ref range: ms                 Status: PreliminaryQTc Calculation (Bazett)                      Date: 01/12/2022Value: 469         Ref range: ms                 Status: PreliminaryP Axis                                        Date: 01/12/2022Value: 64          Ref range: degrees            Status: PreliminaryR Axis                                        Date: 01/12/2022Value: 52          Ref range: degrees            Status: PreliminaryT Axis                                        Date: 01/12/2022Value: 47          Ref range: degrees            Status: PreliminaryCholesterol, Total                            Date: 01/13/2022Value: 137         Ref range: 0 - 199 mg/dL      Status: Final              Comment: ATP III Cholesterol classification is Desirable. Triglycerides                                 Date: 01/13/2022Value: 145         Ref range: 0 - 150 mg/dL      Status: Final              Comment: ATP III Triglycerides Classification is Normal.HDL                                           Date: 01/13/2022Value: 40          Ref range: 40 - 59 mg/dL      Status: Final              Comment: ATP III HDL Cholestrol Classification is low. Expected Values:Males:    >55 = No Risk          35-55 = Moderate Risk          <35 = High RiskFemales:  >65 = No Risk          45-65 = Moderate Risk          <45 = High RiskNCEP Guidelines: Third Report May 2001>59 = negative risk factor for CHD<40 = major risk factor for CHDLDL Calculated                                Date: 01/13/2022Value: 68          Ref range: 0 - 129 mg/dL      Status: Final              Comment: ATP III LDL Classification is Optimal.ACT-K                                         Date: 01/12/2022Value: 344*        Ref range: 82 - 152 sec       Status: FinalSample Type                                   Date: 01/12/2022Value: ART           Status: FinalPerformed on                                  Date: 01/12/2022Value: SEE BELOW     Status: Final              Comment: Performed on POC------------    Radiology last 7 days:  XR CHEST PORTABLEResult Date: 1/12/2022No radiographic evidence of acute intrathoracic process. [unfilled]    Discharge Medications    Current Discharge Medication ListSTART taking these medicationsaspirin 81 MG EC tabletTake 1 tablet by mouth dailyQty: 30 tablet Refills: 3atorvastatin (LIPITOR) 40 MG tabletTake 1 tablet by mouth nightlyQty: 30 tablet Refills: 3metoprolol tartrate (LOPRESSOR) 50 MG tabletTake 1 tablet by mouth 2 times dailyQty: 60 tablet Refills: 3!! ticagrelor (BRILINTA) 90 MG TABS tabletTake 1 tablet by mouth 2 times dailyQty: 60 tablet Refills: 0!! ticagrelor (BRILINTA) 90 MG TABS tabletTake 1 tablet by mouth 2 times dailyQty: 180 tablet Refills: 3losartan (COZAAR) 25 MG tabletTake 1 tablet by mouth dailyQty: 30 tablet Refills: 5!! - Potential duplicate medications found. Please discuss with provider.     Current Discharge Medication List    Current Discharge Medication ListCONTINUE these medications which have NOT CHANGEDfluticasone (FLONASE) 50 MCG/ACT nasal spraypantoprazole (PROTONIX) 40 MG tabletescitalopram (LEXAPRO) 10 MG tablet    Current Discharge Medication ListSTOP taking these medicationsdiltiazem (CARDIZEM CD) 240 MG ER capsuleComments:Reason for Stopping:pravastatin (PRAVACHOL) 80 MG tabletComments:Reason for Stopping:    Time Spent on Discharge:4E] minutes were spent in patient examination, evaluation, counseling as well as medication reconciliation, prescriptions for required medications, discharge plan, and follow up.     Electronically signed by DO Higinio on 1/13/22 at 9:17 AM EST

## 2022-01-13 NOTE — CARE COORDINATION
Definition and description of a heart attack explained. Brief overview of the heart anatomy reviewed with pt. CAD progression discussed. During a MI, lack of oxygen and damage to heart muscle explained. Symptoms of a MI reviewed. Pt. verbalizes that they experienced:   Post MI complications reviewed including arrhythmias, pumping problems, inflammation (pericarditis). Hospital course reviewed including testing: Lab work, B/P, ECG, ECHO, Stress testing, and Heart Cath. Treatments also reviewed from medication, angioplasty and stenting, to CABG. Patient had: Emergent PCI and stenting to RCA  Plan post discharge includes: Follow up with cardiology. Physical activity discussed including cardiac rehab. Pt advised to follow their physician's discharge instructions for activity restrictions, if any. Risk factors reviewed including: smoking, high cholesterol, HTN, DM, obesity, sedentary lifestyle, and stress. Pt. encouraged to make lifestyle changes to improve their health and lower these risk factors. Stress and depression were also discussed. S/S depression reviewed as well as encouragement to talk with someone if symptoms are noticed. Importance of follow up with the cardiologist reinforced. MI Zone booklet also reviewed. Goal is to keep patient in the \"green\" zone. She verbalizes understanding to call the doctor ASAP when experiencing symptoms in the \"yellow\" zone. Instructed to call 911 when S/S of \"red\" zone begin. Reminder to never drive after taking nitroglycerine. Copy of MI booklet and zone pamphlet provided to the patient for review. Patient denies further questions at this time.    Electronically signed by Ellie Orona RN on 1/13/2022 at 1:22 PM

## 2022-01-13 NOTE — BRIEF OP NOTE
Section of Cardiology  Adult Brief Cardiac Cath Procedure Note        Procedure(s):  LHC, b/l coronary angio, PCI of RCA with YOLIS 3.0x15mm Resolute    Pre-operative Diagnosis:  STEMI    H&P Status: Completed and reviewed. Post-operative Diagnosis:      LV EF of 65%  LM normal   LAD mid 50% stenosis  CX mild disease  RCA 99% prox.      Findings:  See full report    Complications:  none    Primary Proceduralist:   Dr.Wes Molina DO    Plan    DAPT  RFM  Max cardiac meds  ICU care check ECHO      Full procedure note to follow

## 2022-01-13 NOTE — PROGRESS NOTES
0700am Report from [facility] and oriented VSS MP-SR. R radial cath site dry intact no thrill bruit hematoma  0800am Dr Marquis Taylor in to see patient orders  11:30am DC instructions given to patient and niece. Verbalized understanding.  Discharged via wheelchair

## 2022-01-13 NOTE — PROGRESS NOTES
Spiritual Care Services     Summary of Visit:      Spiritual Assessment/Intervention/Outcomes:    Encounter Summary  Services provided to[de-identified] Patient  Referral/Consult From[de-identified] Multi-disciplinary team  Support System: Family members  Place of Buddhism: [place]  Continue Visiting: Yes  Complexity of Encounter: Moderate  Length of Encounter: 15 minutes  Advance Care Planning: Yes     Crisis  Type: Stemi Alert  Assessment: Approachable,Anxious,Coping  Intervention: Sustaining presence/ Ministry of presence,Contacted support as requested per patient/family request  Who?: sister Bell Baker  Why?: to inform her  At Request Of: patient  Outcome: Expressed gratitude,Receptive,Less anxious, less agitated           Primary Decision [employer] (Healthcare Proxy)  Patient's Healthcare Decision Maker is[de-identified] Legal Next of [address] / Beliefs  Do you have any ethnic, cultural, sacramental, or spiritual Gnosticism needs you would like us to be aware of while you are in the hospital?: No    Care Plan:        [id number] [address]   Electronically signed by Ying Bear on 1/13/22 at 10:18 AM EST     To reach a  for emotional and spiritual support, place an [facility] consult request.   If a  is needed immediately, dial 0 and ask to page the on-call .

## 2022-01-13 NOTE — CARE COORDINATION
Daily Update    From: Home alone, independent. No DME, no O2. PMH: Htn, hyperlipidemia, anx/dep, current NSTEMI. Anticipated Discharge Date: Possibly today. Anticipated Discharge Disposition: Home, denies needs. Patient Mobility or PT/OT ordered: NA - Patient is up independently. Covid Test Date and Result: 1/12 ~ Positive. Barriers to Discharge:  - Has DC orders from cardiologist.    Readmission Risk              Risk of Unplanned Readmission:  11          CMI/ACP done.     Tevin Dumont RN  January 13, 2022

## 2022-01-13 NOTE — PROGRESS NOTES
CLINICAL PHARMACY NOTE: MEDS TO BEDS    Total # of Prescriptions Filled: 1   The following medications were delivered to the patient:  · Brilinta 90 mg Tab    Additional Documentation:

## 2022-01-13 NOTE — CARE COORDINATION
[facility] Case Management   Initial Discharge Assessment    Met with patient at bedside to discuss discharge plan. PCP: Heather Sprague MD                                  Date of Last Visit: 1/4/22  If no PCP, list provided? N/A    Discharge Planning    Living Arrangements: Patient lives independently at home. Who do you live with? Patient lives alone since her spouse passed away over a year ago. Who helps you with your care: Patient reports that she is independent at baseline. If lives at home: Do you have any barriers navigating in your home? No    Patient can perform ADL? Yes    Current Services (outpatient and in home): None    Dialysis: No    Is transportation available to get to your appointments? Yes - Patient drives. DME Equipment: None    Respiratory equipment: None    Respiratory provider: No     [facility] [place] [facility] [address] [place] [address] with [address]?  No      Patient agreeable to [facility]? Declined    Patient agreeable to SNF/Rehab? Declined    Other discharge needs identified? N/A - Patient denies needs at DC, states she has family members that will check on her and assist with anything she needs. Patient is up independently in her room. Does Patient Have a High-Risk for Readmission Diagnosis (CHF, PN, MI, COPD)? Yes    If Yes,     Consult with pulmonologist? [address] with cardiologist? Yes    Consult with cardiac rehab? Yes    Initial Discharge Plan? (Note: please see concurrent daily documentation for any updates after initial note). Home alone, denies needs at DC.     Readmission Risk              Risk of Unplanned Readmission:  11         Electronically signed by Jose Kelley RN on 1/13/2022 at 10:32 AM

## 2022-01-13 NOTE — H&P
Chief Complaint   Patient presents with    Chest Pain        Patient is a 66 y.o. female who presents with a chief complaint of CP. Patient is followed on a regular basis by Dr. Raina Perez MD.  Patient presented with very typical anginal symptoms and noted to have ST elevation MI on the EKG and code purple was activated. Patient with history of hypertension hyperlipidemia and carotid artery disease status post recent left carotid endarterectomy. She denies history of diabetes or tobacco use. She quit in the .     Past Medical History:   Diagnosis Date    Anxiety     Breast cancer ([address]     Depression     Gallbladder disease     cholecystitis    GERD (gastroesophageal reflux disease)     Hypercholesterolemia     Hyperlipidemia     Hypertension       Patient Active Problem List   Diagnosis    Hyperlipidemia    Hypertension    GERD (gastroesophageal reflux disease)    [facility]    Fat necrosis    ST elevation myocardial infarction involving right coronary artery ([address]       Past Surgical History:   Procedure Laterality Date    BREAST BIOPSY Left     benign    BREAST BIOPSY Right     rash     BREAST BIOPSY Right 2013    Right breast biopsy with biopsy of right breast skin    BREAST SURGERY Right 13    Right simple mastectomy with SNB    CHOLECYSTECTOMY  2000    MASTECTOMY Right 2013    with axillary sentinel lymph node Bx     OTHER SURGICAL HISTORY  11/10/14    MULT MASSES EXC CHEST WALL       Social History     Socioeconomic History    Marital status:      Spouse name: Not on file    Number of children: Not on file    Years of education: Not on file    Highest education level: Not on file   Occupational History    Not on file   Tobacco Use    Smoking status: Former Smoker     Packs/day: 1.00     Years: 30.00     Pack years: 30.00     Types: Cigarettes     Quit date: 1996     Years since quittin.0    Smokeless tobacco: Never Used Substance and Sexual Activity    Alcohol use: Never    Drug use: Not on file    Sexual activity: Not on file   Other Topics Concern    Not on file   Social History Narrative    Not on file     Social Determinants of Health     Financial Resource Strain:     Difficulty of Paying Living Expenses: Not on file   Food Insecurity:     Worried About Running Out of Food in the Last Year: Not on file    Darek of Food in the Last Year: Not on file   Transportation Needs:     Lack of Transportation (Medical): Not on file    Lack of Transportation (Non-Medical):  Not on file   Physical Activity:     Days of Exercise per Week: Not on file    Minutes of Exercise per Session: Not on file   Stress:     Feeling of Stress : Not on file   Social Connections:     Frequency of Communication with Friends and Family: Not on file    Frequency of Social Gatherings with Friends and Family: Not on file    Attends Jew Services: Not on file    Active Member of [address] [place] [address] or Organizations: Not on file    Attends Club or Organization Meetings: Not on file    Marital Status: Not on file   Intimate Partner Violence:     Fear of Current or Ex-Partner: Not on file    Emotionally Abused: Not on file    Physically Abused: Not on file    Sexually Abused: Not on file   Housing Stability:     Unable to Pay for Housing in the Last Year: Not on file    Number of [place] in the Last Year: Not on file    Unstable Housing in the Last Year: Not on file       Family History   Problem Relation Age of Onset    Cancer Mother         Melanoma       Current Facility-Administered Medications   Medication Dose Route Frequency Provider Last Rate Last Admin    0.9 % sodium chloride infusion   IntraVENous Continuous Loi Goodman  mL/hr at 01/12/22 1830 New Bag at 01/12/22 1830    ondansetron (ZOFRAN) 4 MG/2ML injection             fentaNYL (SUBLIMAZE) injection 25 mcg  25 mcg IntraVENous Once Dorcas Mj, DO        iopamidol (ISOVUE-300) 61 % injection 60 mL  60 mL Other Once Bennie J Holiday, DO         Current Outpatient Medications   Medication Sig Dispense Refill    escitalopram (LEXAPRO) 10 MG tablet       diltiazem (CARDIZEM CD) 240 MG ER capsule       fluticasone (FLONASE) 50 MCG/ACT nasal spray       pantoprazole (PROTONIX) 40 MG tablet       pravastatin (PRAVACHOL) 80 MG tablet Take 80 mg by mouth nightly          ALLERGIES: Amoxicillin    Review of Systems   Constitutional: Negative. Negative for chills and fever. HENT: Negative. Eyes: Negative. Respiratory: Positive for shortness of breath. Negative for wheezing. Cardiovascular: Positive for chest pain. Negative for palpitations and leg swelling. Gastrointestinal: Positive for nausea. Negative for abdominal pain and vomiting. Endocrine: Negative. Genitourinary: Negative. Musculoskeletal: Negative. Skin: Negative. Negative for rash. Allergic/Immunologic: Negative. Neurological: Negative for dizziness, weakness and headaches. Hematological: Negative. Psychiatric/Behavioral: Negative. VITALS:  Blood pressure (!) 161/88, pulse 77, temperature 97.3 °F (36.3 °C), temperature source Temporal, resp. rate 14, weight 160 lb (72.6 kg), SpO2 100 %. Body mass index is 31.25 kg/m². Physical Exam  Constitutional:       Appearance: She is well-developed. She is not diaphoretic. HENT:      Head: Normocephalic and atraumatic. Eyes:      Conjunctiva/sclera: Conjunctivae normal.      Pupils: Pupils are equal, round, and reactive to light. Neck:      Thyroid: No thyromegaly. Vascular: Normal carotid pulses. No carotid bruit, hepatojugular reflux or JVD. Trachea: No tracheal deviation. Cardiovascular:      Rate and Rhythm: Normal rate and regular rhythm. Chest Wall: PMI is not displaced. Pulses: Intact distal pulses. Carotid pulses are 3+ on the right side and 3+ on the left side.        Radial pulses are 3+ on the right side and 3+ on the left side. Femoral pulses are 3+ on the right side and 3+ on the left side. Popliteal pulses are 3+ on the right side and 3+ on the left side. Dorsalis pedis pulses are 3+ on the right side and 3+ on the left side. Posterior tibial pulses are 3+ on the right side and 3+ on the left side. Heart sounds: Normal heart sounds, S1 normal and S2 normal. No murmur heard. No friction rub. No gallop. No S3 or S4 sounds. Pulmonary:      Effort: Pulmonary effort is normal. No tachypnea or respiratory distress. Breath sounds: Normal breath sounds. No wheezing, rhonchi or rales. Chest:      Chest wall: No tenderness. Abdominal:      General: Bowel sounds are normal. There is no distension. Palpations: Abdomen is soft. Tenderness: There is no abdominal tenderness. There is no guarding or rebound. Musculoskeletal:         General: No tenderness. Normal range of motion. Skin:     General: Skin is warm. Findings: No erythema or rash. Nails: There is no clubbing. Neurological:      Mental Status: She is alert and oriented to person, place, and time. Cranial Nerves: No cranial nerve deficit. Coordination: Coordination normal.   Psychiatric:         Behavior: Behavior normal.         Thought Content:  Thought content normal.         Judgment: Judgment normal.         LABS:  Recent Results (from the past 24 hour(s))   CBC Auto Differential    Collection Time: 01/12/22  6:30 PM   Result Value Ref Range    WBC 16.0 (H) 4.8 - 10.8 K/uL    RBC 4.00 (L) 4.20 - 5.40 M/uL    Hemoglobin 12.8 12.0 - 16.0 g/dL    Hematocrit 38.7 37.0 - 47.0 %    MCV 96.9 82.0 - 100.0 fL    MCH 32.0 (H) 27.0 - 31.3 pg    MCHC 33.0 33.0 - 37.0 %    RDW 13.4 11.5 - 14.5 %    Platelets 261 229 - 761 K/uL    Neutrophils % 72.6 %    Lymphocytes % 15.9 %    Monocytes % 7.8 %    Eosinophils % 3.2 %    Basophils % 0.5 %    Neutrophils Absolute 11.6 (H) 1.4 - 6.5 K/uL    Lymphocytes Absolute 2.5 1.0 - 4.8 K/uL    Monocytes Absolute 1.2 (H) 0.2 - 0.8 K/uL    Eosinophils Absolute 0.5 0.0 - 0.7 K/uL    Basophils Absolute 0.1 0.0 - 0.2 K/uL   Comprehensive Metabolic Panel    Collection Time: 01/12/22  6:30 PM   Result Value Ref Range    Sodium 136 135 - 144 mEq/L    Potassium 4.3 3.4 - 4.9 mEq/L    Chloride 102 95 - 107 mEq/L    CO2 23 20 - 31 mEq/L    Anion Gap 11 9 - 15 mEq/L    Glucose 126 (H) 70 - 99 mg/dL    BUN 15 8 - 23 mg/dL    CREATININE 0.71 0.50 - 0.90 mg/dL    GFR Non-African American >60.0 >60    GFR  >60.0 >60    Calcium 8.6 8.5 - 9.9 mg/dL    Total Protein 6.4 6.3 - 8.0 g/dL    Albumin 3.4 (L) 3.5 - 4.6 g/dL    Total Bilirubin 0.5 0.2 - 0.7 mg/dL    Alkaline Phosphatase 70 40 - 130 U/L    ALT 29 0 - 33 U/L    AST 37 (H) 0 - 35 U/L    Globulin 3.0 2.3 - 3.5 g/dL   Troponin    Collection Time: 01/12/22  6:30 PM   Result Value Ref Range    Troponin <0.010 0.000 - 0.010 ng/mL   Lipid Panel    Collection Time: 01/12/22  6:30 PM   Result Value Ref Range    Cholesterol, Total 155 0 - 199 mg/dL    Triglycerides 134 0 - 150 mg/dL    HDL 42 40 - 59 mg/dL    LDL Calculated 86 0 - 129 mg/dL   POCT CREATININE    Collection Time: 01/12/22  6:36 PM   Result Value Ref Range    POC CREATININE WHOLE BLOOD 0.8    COVID-19, Rapid    Collection Time: 01/12/22  6:44 PM    Specimen: Nasopharyngeal Swab   Result Value Ref Range    SARS-CoV-2, NAAT DETECTED (A) Not Detected     Troponin:   Lab Results   Component Value Date    TROPONINI <0.010 01/12/2022       EKG: normal sinus rhythm, ST elevation in inferior leads with reciprocal changes. ASSESSMENT:    Active Hospital Problems    Diagnosis Date Noted    ST elevation myocardial infarction involving right coronary artery [facility]) [I21.11] 01/12/2022     Priority: High     STEMI  HTN  HLD  Carotid artery disease status post left carotid endarterectomy recently. PLAN:   1.  As always, aggressive risk factor modification is strongly recommended. We should adhere to the JNC VIII guidelines for HTN management and the NCEPATP III guidelines for LDL-C management. 2. Emergent LHC  3. Echo  4. Max cardiac meds  5. GI/DVT proph  6.  Monitor in ICU post procedure    Electronically signed by Gala Linder DO on 1/12/2022 at 7:16 PM

## 2022-01-13 NOTE — DISCHARGE INSTR - DIET

## 2022-01-13 NOTE — PROGRESS NOTES
Spiritual Care Services     Summary of Visit:  Code Purple    Patient brought in by patience unaware that she was having a heart attack. Patient was busily engaged with medical team preparing for catherization. Patient requested that her sister Eddie Corey be contacted. This writer did contact sister. Sister unable to come to hospital due to medical issues with her  and just getting over covid. Sister requested that she be called to update her sister's condition. Spiritual Assessment/Intervention/Outcomes:    Encounter Summary  Services provided to[de-identified] Patient  Referral/Consult From[de-identified] Physician  Support System: Family members  Place of Confucianist: [place]  Continue Visiting: Yes  Complexity of Encounter: Moderate  Length of Encounter: 30 minutes     Crisis  Type: Stemi Alert  Assessment: Approachable,Anxious,Coping  Intervention: Sustaining presence/ Ministry of presence,Contacted support as requested per patient/family request  Who?: sister Tereza Helm  Why?: to inform her  At Request Of: patient  Outcome: Expressed gratitude,Receptive,Less anxious, less agitated                            Care Plan:    Spiritual care to follow up to provide continued support to patient. Spiritual Care Services   Electronically signed by Maya Navarro on 1/12/22 at 7:25 PM EST     To reach a  for emotional and spiritual support, place an [facility] consult request.   If a  is needed immediately, dial 0 and ask to page the on-call .

## 2022-01-13 NOTE — CONSULTS
Cardiac Rehab Consult Note  Name: Beata Roman  Age: 66 y.o. Gender: female    Chief Complaint:Chest Pain    Primary Care Provider: Khadijah Aragon MD  InpatientTreatment Team: Treatment Team: Attending Provider: Gisel Castro DO; Registered Nurse: Lisa Hare RN  Admission Date: 1/12/2022    Consult Received from Dr. Bridgette Anglin. Reason for consult PCI. Patient in droplet plus isolation. Cardiac Rehab information provided to Nurse. Program and benefits introduced. Brochures given. Active Problems:    ST elevation myocardial infarction involving right coronary artery (HCC)  Resolved Problems:    * No resolved hospital problems. *       Plan: TBD    All of pt questions were answered. Case will be discussed with physician when appropriate.      Electronically signed by Vasile Altamirano on 1/13/2022 at 9:13 AM

## 2022-01-13 NOTE — ACP (ADVANCE CARE PLANNING)
Advance Care Planning     Advance Care Planning Inpatient Note  [facility]    Today's Date: 1/13/2022  Unit: [facility]    Received request from IDT Member. Upon review of chart and communication with care team, patient's decision making abilities are not in question. . Patient was/were present in the room during visit. Goals of ACP Conversation:  Facilitate a discussion related to patient's goals of care as they align with the patient's values and beliefs. Health Care Decision Makers:       Primary Decision Maker: Yadi Rich - Brother/Sister - [phone]    Supplemental (Other) Decision Maker: Ricardo Fitzgerald - Niece/Nephew - [phone]    Summary:  [address]  Patient said her sister is her primary agent. Advance Care Planning Documents (Patient Wishes):  None     Assessment:      Interventions:  Encouraged ongoing ACP conversation with future decision makers and loved ones    Care Preferences Communicated:   Ventilation:   If the patient, in their present state of health, suddenly became very ill and unable to breathe on their own,     the patient would desire the use of a ventilator (breathing machine). If their health worsens and it becomes clear that the change of recovery is unlikely,     the patient would NOT desire the use of a ventilator (breathing machine). Resuscitation:  In the event the patient's heart stopped as a result of an underlying serious health condition, the patient communicates a preference for      resuscitative attempts (CPR). Outcomes/Plan:  New advance directive completed.     Electronically signed by Chaplain Kimberley on 1/13/2022 at 10:22 AM

## 2022-01-14 ENCOUNTER — CARE COORDINATION (OUTPATIENT)
Dept: CASE MANAGEMENT | Age: 72
End: 2022-01-14

## 2022-01-14 DIAGNOSIS — I21.11 ST ELEVATION MYOCARDIAL INFARCTION INVOLVING RIGHT CORONARY ARTERY (HCC): Primary | ICD-10-CM

## 2022-01-14 NOTE — CARE COORDINATION
Transitions of Care Call  Call within 2 business days of discharge: Yes    Patient: Panfilo Almaguer Patient : 1950   MRN: [id number]  Reason for Admission: 2022 - 2022 [facility]. CV-19 (pos 22), STEMI, Psychosis, Heart catheterization. Discharge Date: 22 RARS: Readmission Risk Score: 10.1 ( )  NR  CV-19    Last Discharge [facility]       Complaint Diagnosis Description Type Department Provider    22 Chest Pain ST elevation myocardial infarction involving right coronary artery ([facility] . .. ED to Hosp-Admission (Discharged) (ADMITTED) [facility] Bennie Molina DO; Wendy Victoria. .. Was this an external facility discharge? No Discharge Facility:     Challenges to be reviewed by the provider   Additional needs identified to be addressed with provider: No  none                 Encounter was not routed to provider for escalation. Method of communication with provider: none. Discussed COVID-19 related testing which was: available at this time. Test results were: positive. Patient informed of results, if available? Yes. Pt states she was pos covid last Nov at her PCP office. Tested pos 22. Current Symptoms: dizziness/lightheadedness and decreased smell/taste.  /66,  (pt was up and active prior to checking). Pt alert and oriented answering questions appropriately. Reviewed New or Changed Meds: yes [facility] referral to review Brilinta/answer pt questions. Do you have what you need at home?  Durable Medical Equipment ordered at discharge: Cane/Walker/Crutches   Home Health/Outpatient orders at discharge: cardiac rehab  9:30.  Was patient discharged with a pulse oximeter? No Discussed and confirmed pulse oximeter discharge instructions and when to notify provider or seek emergency care.     Patient education provided: Reviewed appropriate site of care based on symptoms and resources available to patient including: When to call 911. Follow up appointment scheduled within 7 days of discharge: yes. If no appointment scheduled, scheduling offered: PCP 1/17 1:40, Dr Francois Martinez 2/11 11:30. Future Appointments   Date Time Provider Fernie Rader   1/21/2022  9:30 AM DILIP Urias - KRISH Jacinto [facility]   2/11/2022 11:30 AM Bennie [facility]       Interventions: Reviewed s/s MI to call 46. Reviewed discharge instructions, medical action plan and red flags with patient who verbalized understanding. Plan for follow-up call in 5-7 days based on severity of symptoms and risk factors. Plan for next call: Pt had TIA 12/27, had recent CEA sx, tested pos covid last Nov at PCP office, current covid test pos, New to Brilinta, dizziness, decreased smell/taste, RD referral for 15-20 lb wt gain and states she is eating junk foods to console self after loff of spouse 1.5 yrs ago, pending cardiac rehab, has 60 day Brilinta supply (concern for cost), [facility] referral to review anticoags. Provided contact information for future needs.     Darrin Yang RN

## 2022-01-14 NOTE — PROGRESS NOTES
Physician Progress Note      PATIENTTerrence Shone  CSN #:                  [id number]  :                       1950  ADMIT DATE:       2022 6:19 PM  [address] DATE:        2022 11:43 AM  RESPONDING  PROVIDER #:        DORA ORTIZ DO          QUERY TEXT:    Patient admitted with STEMI. Patient with recent Covid infection and noted   positive Covid test on *** (date). If possible, please document in progress   notes and discharge summary if patient has an active Covid-19 infection or if   patient is testing positive for Covid-19 without a current active infection: The medical record reflects the following:  Risk Factors: COVID positive 8 days ago  Clinical Indicators: COVID positive test on admission, cxray negative  Treatment: evita RODRIGUES, RN, [facility]  [phone]  Options provided:  -- Active Covid-19 infection is being treated and/or evaluated on current   encounter  -- Positive Covid test result without an active current Covid-19 infection  -- Other - I will add my own diagnosis  -- Disagree - Not applicable / Not valid  -- Disagree - Clinically unable to determine / Unknown  -- Refer to Clinical Documentation Reviewer    PROVIDER RESPONSE TEXT:    Patient is testing positive for Covid without an active Covid-19 infection.     Query created by: Radha Huitron on 2022 10:30 AM      Electronically signed by:  Darryl ORTIZ DO 2022 8:16 AM

## 2022-01-17 ENCOUNTER — CARE COORDINATION (OUTPATIENT)
Dept: CARE COORDINATION | Age: 72
End: 2022-01-17

## 2022-01-17 ENCOUNTER — TELEPHONE (OUTPATIENT)
Dept: PHARMACY | Facility: CLINIC | Age: 72
End: 2022-01-17

## 2022-01-17 NOTE — CARE COORDINATION
Ivan Hart  January 17, 2022    Initial Referral Reason: Poor Appetite     Patient Care Team:  Doneen Homans, MD as PCP - General (Internal Medicine)  Talia Delong MD (General Surgery)  Charlene Seay MD (Hematology and Oncology)  Rony Mcneil DO as Consulting Physician (Cardiology)  Cory Swanson, RN as Care Transitions Nurse  Linette Smallwood RD, LD as Dietitian (Dietitian)    Past Medical History:    Current Outpatient Medications   Medication Sig Dispense Refill    aspirin 81 MG EC tablet Take 1 tablet by mouth daily 30 tablet 3    atorvastatin (LIPITOR) 40 MG tablet Take 1 tablet by mouth nightly 30 tablet 3    metoprolol tartrate (LOPRESSOR) 50 MG tablet Take 1 tablet by mouth 2 times daily 60 tablet 3    ticagrelor (BRILINTA) 90 MG TABS tablet Take 1 tablet by mouth 2 times daily 180 tablet 3    losartan (COZAAR) 25 MG tablet Take 1 tablet by mouth daily 30 tablet 5    escitalopram (LEXAPRO) 10 MG tablet  (Patient not taking: Reported on 1/14/2022)      fluticasone (FLONASE) 50 MCG/ACT nasal spray 1 spray by Nasal route daily       pantoprazole (PROTONIX) 40 MG tablet Take 40 mg by mouth daily        No current facility-administered medications for this visit.        Biochemical Data, Medical Tests and Procedures:    No results found for: LABA1C  No results found for: EAG    Lab Results   Component Value Date    CHOL 137 01/13/2022    CHOL 155 01/12/2022     Lab Results   Component Value Date    TRIG 145 01/13/2022    TRIG 134 01/12/2022     Lab Results   Component Value Date    HDL 40 01/13/2022    HDL 42 01/12/2022     Lab Results   Component Value Date    LDLCALC 68 01/13/2022    LDLCALC 86 01/12/2022     Anthropometric Measurements:    Height: 60 inches (152.4 cm)  Weight: 160 lb (72.6 kg)  BMI: 31.25 (obesity class I)  IBW: 100 lb (45.5 kg) +-10%  %IBW: 160%   AdBW: 124 lb (56.4 kg)    Physical Exam Findings:  Deferred    Nutrition Interview: RD called pt, explained reason for call and role in care. Pt states appetite is \"awful\"- patient explains she feels hungry but nothing sounds appetizing. Patient states she eats one small meal at the most. Patient states this has been going on since 12/28/21- pt explains she has had two surgeries and currently has Covid. RD explained the importance of meeting estimated nutrition needs daily. Discussed eating small balanced snacks consistently throughout the day instead of three large meals. Explained the components of a balanced snack include a serving of protein with a serving of carbohydrates. Reviewed protein sources. Provided examples of balanced healthy snacks such as apple with cheese stick, banana with peanut butter, greek yogurt with fruit, handful of nuts with fruit, etc. Pt verbalizes understanding. RD discussed supplementing with Ensure or Boost- pt states she does not like these drinks and asked specifically about [employer] Breakfast Essentials. RD reviewed the importance of watching sodium intake daily to help control BP. Pt has no nutrition related questions at this time. 24-Hour Diet Recall  Breakfast  Consumed: eggs and toast    Lunch  Consumed: none    Dinner  Consumed: none    Snacks  Consumed: apple, banana, crackers    Beverages: water, tea    Nutrition Diagnosis:  #1 Problem Inadequate energy intake       Etiology related to poor appetite       Signs/Symptoms as evidenced by food recall     Nutrition Intervention:     Estimated Needs  regular diet providing 1400 kcals to promote wt maintenance ([address] based on CBW). Estimated daily Protein Needs: 58-73 g (based on 0.8-1.0 g/kg based on CBW)  Estimated daily Fluid Needs: 64 oz. #1 Nutrition Information: Provided patient with no handouts. Monitoring and Evaluation:    Indicator/Goal Criteria   #1 Eat small balanced snacks consistently throughout the day. #1 Incorporate a serving of protein and a serving of carbohydrate for snacks.       Follow Up: RD will call pt in 2 weeks to follow up. RD will answer any nutrition related questions at this time.      [address] [facility], [facility] [address]

## 2022-01-17 NOTE — CARE COORDINATION
[address] and left voicemail regarding Dietitian referral. Left call back number and will follow up as appropriate.        [address] [facility], [address] [place] [address]

## 2022-01-17 NOTE — TELEPHONE ENCOUNTER
Received a referral: from [facility], Care Coordinator  to review patients medications. Called patient to schedule a time to speak with a pharmacist over the telephone. \"Reviewed New or Changed Meds: yes [facility] referral to review Brilinta/answer pt questions. \"      Spoke to patient and advised them of the above message. Patient declined services at this time. Patient said that her Brilinta 90mg is $300 for a 30 day supply. She stated that she has an appointment with her cardiologist on 2/1/22 and is hoping to address this issue at the time of visit. As of now, patient has a 60 day supply of Brilinta. Provided patient with our phone number incase she changes her mind. Patient appreciative of the call. For Dong Schmidt in place:  No   Recommendation Provided To: Patient/Caregiver: 1 via Telephone   Gap Closed?: No    Intervention Accepted By: Patient/Caregiver: 0   Time Spent (min): 10          [address] [facility],[address].    [address] [facility] free: [phone]

## 2022-01-18 LAB
EKG ATRIAL RATE: 71 BPM
EKG P AXIS: 61 DEGREES
EKG P-R INTERVAL: 226 MS
EKG Q-T INTERVAL: 396 MS
EKG QRS DURATION: 100 MS
EKG QTC CALCULATION (BAZETT): 430 MS
EKG R AXIS: 70 DEGREES
EKG T AXIS: 99 DEGREES
EKG VENTRICULAR RATE: 71 BPM

## 2022-01-20 ENCOUNTER — CARE COORDINATION (OUTPATIENT)
Dept: CASE MANAGEMENT | Age: 72
End: 2022-01-20

## 2022-01-20 RX ORDER — PRAVASTATIN SODIUM 80 MG/1
80 TABLET ORAL DAILY
COMMUNITY

## 2022-01-20 NOTE — CARE COORDINATION
Follow Up Call    1/12/2022 - 1/13/2022 [facility]. CV-19 (pos 1/12/22), STEMI, Psychosis, Heart catheterization. Challenges to be reviewed by the provider   Additional needs identified to be addressed with provider: No  none                 Encounter was not routed to provider for escalation. Method of communication with provider:  none. Contacted the patient by telephone to follow up after hospital visit. Status: improved. Pt reports some mild head cold like symptoms. States nasal congestion at time. Stopped Lipitor and restarted on Pravastatin. No chest pain/pressure or palpitation events and states she saw her PCP yesterday w. Stable BP/HR. Reviewed dietary changes to increase HDL and avoid cholesterol elevations (last lab all normal). Interventions to address identified needs: Reviewed cardiac diet. [facility] follow up appointment(s):   Future Appointments   Date Time Provider Fernie Rader   2/1/2022 11:30 AM Estephania Valenzuela   2/11/2022 11:30 AM Bennie Ortiz DO [address] [place] [address]-[facility] follow up appointment(s):    Follow up appointment completed? Yes. Provided contact information for future needs. No further follow-up call indicated based on severity of symptoms and risk factors.     Lucas Saab RN

## 2022-01-27 ENCOUNTER — APPOINTMENT (OUTPATIENT)
Dept: CT IMAGING | Age: 72
End: 2022-01-27
Payer: MEDICARE

## 2022-01-27 ENCOUNTER — HOSPITAL ENCOUNTER (EMERGENCY)
Age: 72
Discharge: HOME | End: 2022-01-27
Attending: STUDENT IN AN ORGANIZED HEALTH CARE EDUCATION/TRAINING PROGRAM
Payer: MEDICARE

## 2022-01-27 ENCOUNTER — OFFICE VISIT (OUTPATIENT)
Dept: CARDIOLOGY CLINIC | Age: 72
End: 2022-01-27
Payer: MEDICARE

## 2022-01-27 ENCOUNTER — APPOINTMENT (OUTPATIENT)
Dept: GENERAL RADIOLOGY | Age: 72
End: 2022-01-27
Payer: MEDICARE

## 2022-01-27 VITALS
HEART RATE: 110 BPM | WEIGHT: 160 LBS | HEIGHT: 60 IN | SYSTOLIC BLOOD PRESSURE: 180 MMHG | BODY MASS INDEX: 31.41 KG/M2 | DIASTOLIC BLOOD PRESSURE: 83 MMHG | OXYGEN SATURATION: 98 %

## 2022-01-27 VITALS
OXYGEN SATURATION: 98 % | TEMPERATURE: 98.3 F | SYSTOLIC BLOOD PRESSURE: 167 MMHG | HEIGHT: 60 IN | HEART RATE: 94 BPM | RESPIRATION RATE: 16 BRPM | DIASTOLIC BLOOD PRESSURE: 73 MMHG | WEIGHT: 160 LBS | BODY MASS INDEX: 31.41 KG/M2

## 2022-01-27 DIAGNOSIS — I10 ESSENTIAL HYPERTENSION, BENIGN: ICD-10-CM

## 2022-01-27 DIAGNOSIS — E66.9 OBESITY, CLASS I, BMI 30-34.9: ICD-10-CM

## 2022-01-27 DIAGNOSIS — I65.21 STENOSIS OF RIGHT CAROTID ARTERY: ICD-10-CM

## 2022-01-27 DIAGNOSIS — I25.2 HISTORY OF ST ELEVATION MYOCARDIAL INFARCTION (STEMI): ICD-10-CM

## 2022-01-27 DIAGNOSIS — I65.23 BILATERAL CAROTID ARTERY STENOSIS: ICD-10-CM

## 2022-01-27 DIAGNOSIS — I10 HYPERTENSION, UNSPECIFIED TYPE: ICD-10-CM

## 2022-01-27 DIAGNOSIS — E78.2 MIXED HYPERLIPIDEMIA: ICD-10-CM

## 2022-01-27 DIAGNOSIS — R42 DIZZINESS: Primary | ICD-10-CM

## 2022-01-27 DIAGNOSIS — I10 PRIMARY HYPERTENSION: Primary | ICD-10-CM

## 2022-01-27 LAB
ALBUMIN SERPL-MCNC: 4.1 G/DL (ref 3.5–4.6)
ALP BLD-CCNC: 87 U/L (ref 40–130)
ALT SERPL-CCNC: 18 U/L (ref 0–33)
ANION GAP SERPL CALCULATED.3IONS-SCNC: 14 MEQ/L (ref 9–15)
AST SERPL-CCNC: 23 U/L (ref 0–35)
BASOPHILS ABSOLUTE: 0.1 K/UL (ref 0–0.2)
BASOPHILS RELATIVE PERCENT: 1 %
BILIRUB SERPL-MCNC: 0.5 MG/DL (ref 0.2–0.7)
BILIRUBIN URINE: NEGATIVE
BLOOD, URINE: NEGATIVE
BUN BLDV-MCNC: 12 MG/DL (ref 8–23)
CALCIUM SERPL-MCNC: 9.6 MG/DL (ref 8.5–9.9)
CHLORIDE BLD-SCNC: 105 MEQ/L (ref 95–107)
CLARITY: CLEAR
CO2: 22 MEQ/L (ref 20–31)
COLOR: YELLOW
CREAT SERPL-MCNC: 0.58 MG/DL (ref 0.5–0.9)
EOSINOPHILS ABSOLUTE: 0.4 K/UL (ref 0–0.7)
EOSINOPHILS RELATIVE PERCENT: 6.4 %
GFR AFRICAN AMERICAN: >60
GFR AFRICAN AMERICAN: >60
GFR NON-AFRICAN AMERICAN: >60
GFR NON-AFRICAN AMERICAN: >60
GLOBULIN: 3.1 G/DL (ref 2.3–3.5)
GLUCOSE BLD-MCNC: 96 MG/DL (ref 70–99)
GLUCOSE URINE: NEGATIVE MG/DL
HCT VFR BLD CALC: 41.2 % (ref 37–47)
HEMOGLOBIN: 14.3 G/DL (ref 12–16)
KETONES, URINE: NEGATIVE MG/DL
LEUKOCYTE ESTERASE, URINE: NEGATIVE
LYMPHOCYTES ABSOLUTE: 1.4 K/UL (ref 1–4.8)
LYMPHOCYTES RELATIVE PERCENT: 22.5 %
MAGNESIUM: 2.1 MG/DL (ref 1.7–2.4)
MCH RBC QN AUTO: 33.2 PG (ref 27–31.3)
MCHC RBC AUTO-ENTMCNC: 34.8 % (ref 33–37)
MCV RBC AUTO: 95.4 FL (ref 82–100)
MONOCYTES ABSOLUTE: 0.5 K/UL (ref 0.2–0.8)
MONOCYTES RELATIVE PERCENT: 8.2 %
NEUTROPHILS ABSOLUTE: 3.8 K/UL (ref 1.4–6.5)
NEUTROPHILS RELATIVE PERCENT: 61.9 %
NITRITE, URINE: NEGATIVE
PDW BLD-RTO: 13.4 % (ref 11.5–14.5)
PERFORMED ON: NORMAL
PH UA: 6.5 (ref 5–9)
PLATELET # BLD: 291 K/UL (ref 130–400)
POC CREATININE: 0.6 MG/DL (ref 0.6–1.2)
POC SAMPLE TYPE: NORMAL
POTASSIUM SERPL-SCNC: 4.1 MEQ/L (ref 3.4–4.9)
PRO-BNP: 1338 PG/ML
PROTEIN UA: NEGATIVE MG/DL
RBC # BLD: 4.32 M/UL (ref 4.2–5.4)
SARS-COV-2, NAAT: DETECTED
SODIUM BLD-SCNC: 141 MEQ/L (ref 135–144)
SPECIFIC GRAVITY UA: 1.01 (ref 1–1.03)
TOTAL PROTEIN: 7.2 G/DL (ref 6.3–8)
TROPONIN: <0.01 NG/ML (ref 0–0.01)
TSH REFLEX: 3.66 UIU/ML (ref 0.44–3.86)
URINE REFLEX TO CULTURE: NORMAL
UROBILINOGEN, URINE: 0.2 E.U./DL
WBC # BLD: 6.1 K/UL (ref 4.8–10.8)

## 2022-01-27 PROCEDURE — 71045 X-RAY EXAM CHEST 1 VIEW: CPT

## 2022-01-27 PROCEDURE — 70450 CT HEAD/BRAIN W/O DYE: CPT

## 2022-01-27 PROCEDURE — 84443 ASSAY THYROID STIM HORMONE: CPT

## 2022-01-27 PROCEDURE — 99214 OFFICE O/P EST MOD 30 MIN: CPT | Performed by: INTERNAL MEDICINE

## 2022-01-27 PROCEDURE — 36415 COLL VENOUS BLD VENIPUNCTURE: CPT

## 2022-01-27 PROCEDURE — 80053 COMPREHEN METABOLIC PANEL: CPT

## 2022-01-27 PROCEDURE — 85025 COMPLETE CBC W/AUTO DIFF WBC: CPT

## 2022-01-27 PROCEDURE — 83880 ASSAY OF NATRIURETIC PEPTIDE: CPT

## 2022-01-27 PROCEDURE — 1090F PRES/ABSN URINE INCON ASSESS: CPT | Performed by: INTERNAL MEDICINE

## 2022-01-27 PROCEDURE — 6360000002 HC RX W HCPCS: Performed by: STUDENT IN AN ORGANIZED HEALTH CARE EDUCATION/TRAINING PROGRAM

## 2022-01-27 PROCEDURE — 1123F ACP DISCUSS/DSCN MKR DOCD: CPT | Performed by: INTERNAL MEDICINE

## 2022-01-27 PROCEDURE — 96374 THER/PROPH/DIAG INJ IV PUSH: CPT

## 2022-01-27 PROCEDURE — 6370000000 HC RX 637 (ALT 250 FOR IP): Performed by: STUDENT IN AN ORGANIZED HEALTH CARE EDUCATION/TRAINING PROGRAM

## 2022-01-27 PROCEDURE — 4040F PNEUMOC VAC/ADMIN/RCVD: CPT | Performed by: INTERNAL MEDICINE

## 2022-01-27 PROCEDURE — 70496 CT ANGIOGRAPHY HEAD: CPT

## 2022-01-27 PROCEDURE — 6360000004 HC RX CONTRAST MEDICATION: Performed by: STUDENT IN AN ORGANIZED HEALTH CARE EDUCATION/TRAINING PROGRAM

## 2022-01-27 PROCEDURE — 96361 HYDRATE IV INFUSION ADD-ON: CPT

## 2022-01-27 PROCEDURE — 2580000003 HC RX 258: Performed by: STUDENT IN AN ORGANIZED HEALTH CARE EDUCATION/TRAINING PROGRAM

## 2022-01-27 PROCEDURE — G8400 PT W/DXA NO RESULTS DOC: HCPCS | Performed by: INTERNAL MEDICINE

## 2022-01-27 PROCEDURE — 93005 ELECTROCARDIOGRAM TRACING: CPT | Performed by: STUDENT IN AN ORGANIZED HEALTH CARE EDUCATION/TRAINING PROGRAM

## 2022-01-27 PROCEDURE — 84484 ASSAY OF TROPONIN QUANT: CPT

## 2022-01-27 PROCEDURE — G8428 CUR MEDS NOT DOCUMENT: HCPCS | Performed by: INTERNAL MEDICINE

## 2022-01-27 PROCEDURE — G8417 CALC BMI ABV UP PARAM F/U: HCPCS | Performed by: INTERNAL MEDICINE

## 2022-01-27 PROCEDURE — 1111F DSCHRG MED/CURRENT MED MERGE: CPT | Performed by: INTERNAL MEDICINE

## 2022-01-27 PROCEDURE — 83735 ASSAY OF MAGNESIUM: CPT

## 2022-01-27 PROCEDURE — 70498 CT ANGIOGRAPHY NECK: CPT

## 2022-01-27 PROCEDURE — 87635 SARS-COV-2 COVID-19 AMP PRB: CPT

## 2022-01-27 PROCEDURE — 99285 EMERGENCY DEPT VISIT HI MDM: CPT

## 2022-01-27 PROCEDURE — 81003 URINALYSIS AUTO W/O SCOPE: CPT

## 2022-01-27 PROCEDURE — 3017F COLORECTAL CA SCREEN DOC REV: CPT | Performed by: INTERNAL MEDICINE

## 2022-01-27 PROCEDURE — 1036F TOBACCO NON-USER: CPT | Performed by: INTERNAL MEDICINE

## 2022-01-27 PROCEDURE — G8484 FLU IMMUNIZE NO ADMIN: HCPCS | Performed by: INTERNAL MEDICINE

## 2022-01-27 RX ORDER — MECLIZINE HYDROCHLORIDE 25 MG/1
25 TABLET ORAL ONCE
Status: COMPLETED | OUTPATIENT
Start: 2022-01-27 | End: 2022-01-27

## 2022-01-27 RX ORDER — 0.9 % SODIUM CHLORIDE 0.9 %
500 INTRAVENOUS SOLUTION INTRAVENOUS ONCE
Status: COMPLETED | OUTPATIENT
Start: 2022-01-27 | End: 2022-01-27

## 2022-01-27 RX ORDER — LORAZEPAM 2 MG/ML
0.5 INJECTION INTRAMUSCULAR ONCE
Status: COMPLETED | OUTPATIENT
Start: 2022-01-27 | End: 2022-01-27

## 2022-01-27 RX ORDER — DILTIAZEM HYDROCHLORIDE 240 MG/1
CAPSULE, EXTENDED RELEASE ORAL
COMMUNITY
Start: 2021-12-09 | End: 2022-01-27

## 2022-01-27 RX ORDER — LOSARTAN POTASSIUM 50 MG/1
50 TABLET ORAL DAILY
Qty: 90 TABLET | Refills: 3 | Status: SHIPPED | OUTPATIENT
Start: 2022-01-27 | End: 2022-02-10 | Stop reason: DRUGHIGH

## 2022-01-27 RX ORDER — CLOPIDOGREL BISULFATE 75 MG/1
75 TABLET ORAL DAILY
Qty: 90 TABLET | Refills: 3 | Status: SHIPPED | OUTPATIENT
Start: 2022-01-27

## 2022-01-27 RX ADMIN — IOPAMIDOL 100 ML: 612 INJECTION, SOLUTION INTRAVENOUS at 13:55

## 2022-01-27 RX ADMIN — MECLIZINE HYDROCHLORIDE 25 MG: 25 TABLET ORAL at 11:29

## 2022-01-27 RX ADMIN — SODIUM CHLORIDE 500 ML: 9 INJECTION, SOLUTION INTRAVENOUS at 11:27

## 2022-01-27 RX ADMIN — LORAZEPAM 0.5 MG: 2 INJECTION INTRAMUSCULAR; INTRAVENOUS at 11:57

## 2022-01-27 ASSESSMENT — ENCOUNTER SYMPTOMS
NAUSEA: 0
EYE PAIN: 0
COUGH: 0
SORE THROAT: 0
DIARRHEA: 0
VOMITING: 0
BACK PAIN: 0
ABDOMINAL PAIN: 0
SHORTNESS OF BREATH: 0
RHINORRHEA: 0

## 2022-01-27 NOTE — ED PROVIDER NOTES
[address] [facility] ED  eMERGENCY dEPARTMENT eNCOUnter      Pt Name: Chary Mendes  MRN: [id number]  [place] 1950  Date of evaluation: 1/27/2022  Provider: Sloane Ha MD      HISTORY OF PRESENT ILLNESS      Chief Complaint   Patient presents with    Dizziness     pt c/o dizziness this morning       The history is provided by the Patient. Chary Mendes is a 66 y.o. female with a PMH clinically significant for HTN, HLD, GERD, Breast CA, Anxiety, Depression, CAD s/p PCI, Bilateral Carotid artery stenosis and Obesity presenting to the ED via EMS c/o dizziness after getting up this morning that has been constant and moderate in severity in addition to lightheadedness and feeling very anxious. Patient denies any other associated symptoms. No associated headache, vision changes, photophobia, nausea, vomiting, CP, S OB, cough, abdominal pain, change in bowel movements or urinary symptoms. Also denies any BLE edema/pain. EMS reporting patient initially tachycardic in the 120s and significantly hypertensive. Characterizes dizziness as nonvertiginous. States she will just feels unsteady. Has to walk very slowly due to this. States no trauma prior to onset of symptoms. States she woke up and was feeling anxious due to her cardiology appointment today, but not dizzy. Stood up and then had the dizziness that has been constant. States symptoms worse with movement. Improved with nothing. Present at rest.  States history of similar previous episodes when she had her last carotid endarterectomy. Taking all medications as indicated. Per Chart Review: most recent admission on 1/12/2022 appreciated. Patient admitted for STEMI with LHC showing 99% proximal RCA stenosis. Also noted 50% mid LAD stenosis. Stent placed. Echo not completed. Initiated on DAPT. REVIEW OF SYSTEMS       Review of Systems   Constitutional: Negative for chills and fever.    HENT: Negative for rhinorrhea and sore throat. Eyes: Negative for pain and visual disturbance. Respiratory: Negative for cough and shortness of breath. Cardiovascular: Negative for chest pain and palpitations. Gastrointestinal: Negative for abdominal pain, diarrhea, nausea and vomiting. Genitourinary: Negative for difficulty urinating and dysuria. Musculoskeletal: Negative for back pain and neck pain. Skin: Negative for rash. Neurological: Positive for dizziness and light-headedness. Negative for weakness, numbness and headaches. Psychiatric/Behavioral: The patient is nervous/anxious.        PAST MEDICAL HISTORY     Past Medical History:   Diagnosis Date    Anxiety     Breast cancer ([facility]     Depression     Gallbladder disease     cholecystitis    GERD (gastroesophageal reflux disease)     [facility]     [facility]    History of ST elevation myocardial infarction (STEMI) 1/27/2022    Hypercholesterolemia     Hyperlipidemia     Hypertension     TIA (transient ischemic attack)        SURGICAL HISTORY       Past Surgical History:   Procedure Laterality Date    BREAST BIOPSY Left 1999    benign    BREAST BIOPSY Right 2004    rash     BREAST BIOPSY Right 02/14/2013    Right breast biopsy with biopsy of right breast skin    BREAST SURGERY Right 02/27/2013    Right simple mastectomy with [facility]    CAROTID ENDARTERECTOMY Left     CHOLECYSTECTOMY  2000    CORONARY ANGIOPLASTY      01-    MASTECTOMY Right 02/2013    with axillary sentinel lymph node Bx     OTHER SURGICAL HISTORY  11/10/2014    MULT MASSES EXC CHEST WALL       FAMILY HISTORY       Family History   Problem Relation Age of Onset    Cancer Mother         Melanoma       SOCIAL HISTORY       Social History     Socioeconomic History    Marital status:      Spouse name: None    Number of children: None    Years of education: None    Highest education level: None   Occupational History    None   Tobacco Use    Smoking status: Former Smoker Packs/day: 1.00     Years: 30.00     Pack years: 30.00     Types: Cigarettes     Quit date: 1996     Years since quittin.1    Smokeless tobacco: Never Used   Substance and Sexual Activity    Alcohol use: Never    Drug use: None    Sexual activity: None   Other Topics Concern    None   Social History Narrative    None     Social Determinants of Health     Financial Resource Strain:     Difficulty of Paying Living Expenses: Not on file   Food Insecurity:     Worried About Running Out of Food in the Last Year: Not on file    Darek of Food in the Last Year: Not on file   Transportation Needs:     Lack of Transportation (Medical): Not on file    Lack of Transportation (Non-Medical):  Not on file   Physical Activity:     Days of Exercise per Week: Not on file    Minutes of Exercise per Session: Not on file   Stress:     Feeling of Stress : Not on file   Social Connections:     Frequency of Communication with Friends and Family: Not on file    Frequency of Social Gatherings with Friends and Family: Not on file    Attends Yazdanism Services: Not on file    Active Member of [address] [place] [address] or Organizations: Not on file    Attends Club or Organization Meetings: Not on file    Marital Status: Not on file   Intimate Partner Violence:     Fear of Current or Ex-Partner: Not on file    Emotionally Abused: Not on file    Physically Abused: Not on file    Sexually Abused: Not on file   Housing Stability:     Unable to Pay for Housing in the Last Year: Not on file    Number of [place] in the Last Year: Not on file    Unstable Housing in the Last Year: Not on file       CURRENT MEDICATIONS       Discharge Medication List as of 2022  3:45 PM      CONTINUE these medications which have NOT CHANGED    Details   pravastatin (PRAVACHOL) 80 MG tablet Take 80 mg by mouth dailyHistorical Med      aspirin 81 MG EC tablet Take 1 tablet by mouth daily, Disp-30 tablet, R-3OTC      metoprolol tartrate (LOPRESSOR) 50 MG tablet Take 1 tablet by mouth 2 times daily, Disp-60 tablet, R-3Normal      ticagrelor (BRILINTA) 90 MG TABS tablet Take 1 tablet by mouth 2 times daily, Disp-180 tablet, R-3Normal      losartan (COZAAR) 25 MG tablet Take 1 tablet by mouth daily, Disp-30 tablet, R-5Normal      escitalopram (LEXAPRO) 10 MG tablet Historical Med      fluticasone (FLONASE) 50 MCG/ACT nasal spray 1 spray by Nasal route daily Historical Med      pantoprazole (PROTONIX) 40 MG tablet Take 40 mg by mouth daily Historical Med             ALLERGIES     Amoxicillin      PHYSICAL EXAM       ED Triage Vitals [01/27/22 1041]   BP Temp Temp Source Pulse Resp SpO2 Height Weight   (!) 165/88 98.3 °F (36.8 °C) Oral 91 18 97 % 5' (1.524 m) 160 lb (72.6 kg)       Physical Exam  Vitals and nursing note reviewed. Constitutional:       General: She is not in acute distress. HENT:      Head: Normocephalic and atraumatic. Mouth/Throat:      Mouth: Mucous membranes are moist.      Pharynx: Oropharynx is clear. Eyes:      Extraocular Movements: Extraocular movements intact. Pupils: Pupils are equal, round, and reactive to light. Visual Fields: Right eye visual fields normal and left eye visual fields normal.   Neck:      Vascular: No carotid bruit. Comments: Well-healed scar noted over the left-sided anterolateral neck. Cardiovascular:      Rate and Rhythm: Normal rate and regular rhythm. Pulses: Normal pulses. Heart sounds: Normal heart sounds. Pulmonary:      Effort: Pulmonary effort is normal. No respiratory distress. Breath sounds: Normal breath sounds. Abdominal:      General: There is no distension. Palpations: Abdomen is soft. Tenderness: There is no abdominal tenderness. There is no guarding or rebound. Musculoskeletal:         General: No tenderness. Cervical back: Normal range of motion and neck supple. No tenderness. Right lower leg: No edema.       Left lower leg: No edema. Skin:     General: Skin is warm and dry. Capillary Refill: Capillary refill takes less than 2 seconds. Neurological:      General: No focal deficit present. Mental Status: She is alert and oriented to person, place, and time. Cranial Nerves: Cranial nerves are intact. Sensory: Sensation is intact. Motor: Motor function is intact. No tremor. Coordination: Coordination is intact. Finger-Nose-Finger Test and Heel to [facility] Test normal.   Psychiatric:         Mood and Affect: Mood is anxious. Behavior: Behavior normal.         MDM:   Chart Reviewed: PMH and additional information as noted in HPI obtained from chart review    Vitals:    Vitals:    01/27/22 1300 01/27/22 1405 01/27/22 1430 01/27/22 1500   BP: (!) 169/88 (!) 153/80 (!) 159/73 (!) 167/73   Pulse: 91 95 98 94   Resp: 13 17 15 16   Temp:       TempSrc:       SpO2: 98% 100% 99% 98%   Weight:       Height:           PROCEDURES:  Unless otherwise noted below, none  Procedures    LABS:  Labs Reviewed   COVID-19, RAPID - Abnormal; Notable for the following components:       Result Value    SARS-CoV-2, NAAT DETECTED (*)     All other components within normal limits    Narrative:     Virgen Gil tel. [phone],  ., 01/27/2022 11:23, by Yoan Lee   CBC WITH AUTO DIFFERENTIAL - Abnormal; Notable for the following components:    MCH 33.2 (*)     All other components within normal limits   COMPREHENSIVE METABOLIC PANEL   MAGNESIUM   TROPONIN   BRAIN NATRIURETIC PEPTIDE   TSH WITH REFLEX   URINE RT REFLEX TO CULTURE   POCT VENOUS       CTA Neck W WO Contrast   Final Result   NO LARGE VESSEL OCCLUSION OR SIGNIFICANT STENOSIS ON THE CTA OF THE HEAD. Rometta Favre CT OF THE NECK SHOWS EXTENSIVE CALCIFIED PLAQUE AT THE ORIGIN OF THE RIGHT INTERNAL CAROTID ARTERY WITH FOCAL NARROWING OF AT LEAST 75% PREDICTED.       POSTENDARTERECTOMY CHANGES IN THE LEFT INTERNAL CAROTID ARTERY WITH NO STENOSIS      EXAMINATION: CTA HEAD W WO CONTRAST, CTA NECK W WO CONTRAST       DATE AND TIME:1/27/2022 1:33 PM      CLINICAL HISTORY: Stroke  dizziness with known Carotid artery stenosis        COMPARISON: None      TECHNIQUE:Helical CTA of the head and neck was performed from the vertex to the aortic arch following the uneventful intravenous administration of 100 cc of nonionic contrast without incident. 2-D images were reconstructed in the sagittal and coronal    planes. Three Dimensional Maximum Intensity Projection (3D-MIP) images were created. All images were reviewed and primarily archived to PACS workstation. All CT scans at this facility use dose modulation, iterative reconstruction, and/or weight based    dosing when appropriate to reduce radiation dose to as low as reasonably achievable. NASCET Criteria were utilized in calculating the degree of stenosis. Some of this report was completed using  [id number] and may    include unintended errors with respect to translation of words, typographical errors or grammatical errors which may not have been identified prior to the finalization of this report. FINDINGS:       NECK:   Stenosis:  Heavy calcification in the right carotid bifurcation at the origin of the left internal carotid artery limits assessment. There is severe narrowing estimated date and a partially 75% at this site. Otherwise right internal carotid artery is    widely patent. Heavy calcification at the origin of the 3.6 x 1.4 cm x 1.5 cm cystic area along the left lateral neck external to the distal left common carotid artery with signs of previous left endarterectomy. Left internal carotid artery is widely    patent. There is no hemodynamically significant stenosis using NASCET criteria of the left common or internal carotid arteries. Vertebrobasilar system: The vertebral arteries are well visualized to the level of the basilar artery.  There is no focal stenosis aneurysm or dissection. Vertebral arteries are codominant. Dissection: Cervical carotid and vertebral arteries are negative for dissection. Other: No other significant findings            HEAD      Intracranial ICAs: Normal flow in precavernous, cavernous, clinoid and supraclinoid segments of the internal carotid arteries bilaterally   Anterior cerebral arteries:No occlusion or significant stenosis. Middle cerebral arteries:No large vessel occlusion or significant stenosis. Posterior Circulation: No large vessel occlusion or significant stenosis. Basilar artery:No significant stenosis. Other findings: None                        CTA Head W WO Contrast   Final Result   NO LARGE VESSEL OCCLUSION OR SIGNIFICANT STENOSIS ON THE CTA OF THE HEAD. Oneida Maid CT OF THE NECK SHOWS EXTENSIVE CALCIFIED PLAQUE AT THE ORIGIN OF THE RIGHT INTERNAL CAROTID ARTERY WITH FOCAL NARROWING OF AT LEAST 75% PREDICTED. POSTENDARTERECTOMY CHANGES IN THE LEFT INTERNAL CAROTID ARTERY WITH NO STENOSIS      EXAMINATION: CTA HEAD W WO CONTRAST, CTA NECK W WO CONTRAST       DATE AND TIME:1/27/2022 1:33 PM      CLINICAL HISTORY: Stroke  dizziness with known Carotid artery stenosis        COMPARISON: None      TECHNIQUE:Helical CTA of the head and neck was performed from the vertex to the aortic arch following the uneventful intravenous administration of 100 cc of nonionic contrast without incident. 2-D images were reconstructed in the sagittal and coronal    planes. Three Dimensional Maximum Intensity Projection (3D-MIP) images were created. All images were reviewed and primarily archived to PACS workstation. All CT scans at this facility use dose modulation, iterative reconstruction, and/or weight based    dosing when appropriate to reduce radiation dose to as low as reasonably achievable. NASCET Criteria were utilized in calculating the degree of stenosis.   Some of this report was completed using  [employer] Scribe 360 voice-recognition technology and may    include unintended errors with respect to translation of words, typographical errors or grammatical errors which may not have been identified prior to the finalization of this report. FINDINGS:       NECK:   Stenosis:  Heavy calcification in the right carotid bifurcation at the origin of the left internal carotid artery limits assessment. There is severe narrowing estimated date and a partially 75% at this site. Otherwise right internal carotid artery is    widely patent. Heavy calcification at the origin of the 3.6 x 1.4 cm x 1.5 cm cystic area along the left lateral neck external to the distal left common carotid artery with signs of previous left endarterectomy. Left internal carotid artery is widely    patent. There is no hemodynamically significant stenosis using NASCET criteria of the left common or internal carotid arteries. Vertebrobasilar system: The vertebral arteries are well visualized to the level of the basilar artery. There is no focal stenosis aneurysm or dissection. Vertebral arteries are codominant. Dissection: Cervical carotid and vertebral arteries are negative for dissection. Other: No other significant findings            HEAD      Intracranial ICAs: Normal flow in precavernous, cavernous, clinoid and supraclinoid segments of the internal carotid arteries bilaterally   Anterior cerebral arteries:No occlusion or significant stenosis. Middle cerebral arteries:No large vessel occlusion or significant stenosis. Posterior Circulation: No large vessel occlusion or significant stenosis. Basilar artery:No significant stenosis. Other findings: None                        CT Head WO Contrast   Final Result   NO ACUTE INTRACRANIAL PATHOLOGY. XR CHEST PORTABLE   Final Result   NO ACUTE CARDIOPULMONARY DISEASE.           ED Course as of 02/03/22 0447   u Jan 27, 2022   1104 EKG 12 Lead  EKG showing normal sinus rhythm, rate of 88 bpm. Normal axis, normal intervals. T wave inversions noted in the inferior leads. No elevation or depression. [NA]   [id number] SARS-CoV-2, NAAT(!): DETECTED  Known Covid infection, likely outside of infectious window. [NA]   1253 Comprehensive Metabolic Panel:    Sodium 141   Potassium 4.1   Chloride 105   CO2 22   Anion Gap 14   Glucose 96   BUN 12   Creatinine 0.58   GFR Non- >60.0   GFR  >60.0   CALCIUM, SERUM, 738329 9.6   Total Protein 7.2   Albumin 4.1   Bilirubin 0.5   Alk Phos 87   ALT 18   AST 23   Globulin 3.1 [NA]   1253 Troponin: <0.010  Low suspicion for ACS [NA]   1253 TSH: 3.660 [NA]   1253 Pro-BNP: 1,338  Elevated, but within normal limits for age.  [NA]   1254 Magnesium: 2.1 [NA]   1254 Urinalysis Reflex to Culture:    Color, UA Yellow   Clarity, UA Clear   Glucose, UA Negative   Bilirubin, Urine Negative   Ketones, Urine Negative   Specific Gravity, UA 1.006   Blood, Urine Negative   pH, UA 6.5   Protein, UA Negative   Urobilinogen, Urine 0.2   Nitrite, Urine Negative   Leukocyte Esterase, Urine Negative   Urine Reflex to Culture Not Indicated  No evidence of UTI [NA]   1254 CBC Auto Differential(!):    WBC 6.1   RBC 4.32   Hemoglobin Quant 14.3   Hematocrit 41.2   MCV 95.4   MCH 33.2(!)   MCHC 34.8   RDW 13.4   Platelet Count 928   Neutrophils % 61.9   Lymphocyte % 22.5   Monocytes % 8.2   Eosinophils % 6.4   Basophils % 1.0   Neutrophils Absolute 3.8   Lymphocytes Absolute 1.4   Monocytes Absolute 0.5   Eosinophils Absolute 0.4   Basophils Absolute 0.1  Largely unremarkable [NA]   1254 XR CHEST PORTABLE  No evidence of acute cardiopulmonary abnormalities [NA]      ED Course User Index  [NA] Benjamín Rivera MD       66 y.o. female with a PMH clinically significant for HTN, HLD, GERD, Breast CA, Anxiety, Depression, CAD s/p PCI, Bilateral Carotid artery stenosis and Obesity presenting to the ED via EMS c/o dizziness after getting up this morning that has been constant and moderate in severity in addition to lightheadedness and feeling very anxious. Upon initial evaluation, Pt hypertensive and anxious-appearing, but otherwise Afebrile, HDS and in NAD. PE as noted above. Labs, , EKG, and Imaging as noted above. Given findings, clinical presentation most likely consistent w/ dizziness possibly secondary to anxiety versus positional dizziness given the patient's inciting factor including going from sitting to standing. Although considered, low suspicion for central process given lack of coinciding neurological deficits, no nystagmus on exam and normal FTN and HTS testing. CTA of the head and neck also without evidence of acute arterial occlusion. Known right-sided ICA stenosis again demonstrated. Low suspicion for ACS given lack of additional anginal equivalents. Also low suspicion for DVT/PE. Patient was supposed to be evaluated by Dr. Roxi Banks today, so contacted with the ED. Dr. Roxi Banks believing lower suspicion for right-sided carotid stenosis causing the patient's dizziness as the left side is still patent. Was amenable to still seeing the patient today upon discharge for further cardiac evaluation. Given findings, stable for discharge at this time to Dr. Stacy Newby office in addition to recommended follow-up with PCP and neurology. Patient stating that she does feel improved at this time, just worried that will happen again. Pt was administered   Medications   0.9 % sodium chloride bolus (0 mLs IntraVENous Stopped 1/27/22 1406)   meclizine (ANTIVERT) tablet 25 mg (25 mg Oral Given 1/27/22 1129)   LORazepam (ATIVAN) injection 0.5 mg (0.5 mg IntraVENous Given 1/27/22 1157)   iopamidol (ISOVUE-300) 61 % injection 100 mL (100 mLs IntraVENous Given 1/27/22 1355)       Plan: Discharge home in good condition with meds as noted below and instructions to follow up with PCP and Cardiology. Pt stable and appropriate for further evaluation and management as an outpatient.  and Patient understanding and amenable to the POC. CRITICAL CARE TIME   Total CriticalCare time was 0 minutes, excluding separately reportable procedures. There was a high probability of clinically significant/life threatening deterioration in the patient's condition which required my urgent intervention. FINAL IMPRESSION      1. Dizziness    2. Hypertension, unspecified type    3.  Stenosis of right carotid artery          DISPOSITION/PLAN   DISPOSITION Decision To Discharge 01/27/2022 03:43:00 PM      Discharge Medication List as of 1/27/2022  3:45 PM           MD Yovani Hoffman MD  02/03/22 8377

## 2022-01-27 NOTE — ED NOTES
Bed: 23  Expected date: 1/27/22  Expected time: 10:25 AM  Means of arrival:   Comments:  67 yo f c/o dizziness  180/84 98% 20G L [facility]     April L JOE Mcclelland  01/27/22 1040

## 2022-01-27 NOTE — PROGRESS NOTES
Chief Complaint   Patient presents with    Follow-Up from Hospital     stemi    Dizziness     lightheaded, was given meds for anxiety    Other     brilinta is expensive. 1-12-22: Patient is a 66 y.o. female who presents with a chief complaint of CP. Patient is followed on a regular basis by Dr. Arpit Sams MD.  Patient presented with very typical anginal symptoms and noted to have ST elevation MI on the EKG and code purple was activated. Patient with history of hypertension hyperlipidemia and carotid artery disease status post recent left carotid endarterectomy. She denies history of diabetes or tobacco use. She quit in the 1990s. 1-27-22: Patient presents for initial medical evaluation. Patient is followed on a regular basis by Dr. Arpit Sams MD. S/p STEMI and cath with 99% prox RCA with YOLIS, 50% mid LAD, mild CX disease, normal LVF. On DAPT and Brilinta is expensive. Developed dizziness today . She went to ER and BP was elevated in 200's. 180's in the office. Pt denies chest pain, dyspnea, dyspnea on exertion, change in exercise capacity, fatigue,  nausea, vomiting, diarrhea, constipation, motor weakness, insomnia, weight loss, synco, palpitations, PND, orthopnea, or claudication. Hx of Carotid disease s/p left CEA, s/p CTA of neck today with at least  75% NIURKA  EKG with T wave inversion in inferior wall. Likely from recent STEMI of RCA.              Patient Active Problem List   Diagnosis    Hyperlipidemia    Hypertension    GERD (gastroesophageal reflux disease)    Breast cancer ([facility] [address]    Fat necrosis    ST elevation myocardial infarction involving right coronary artery (HCC)    Bilateral carotid artery stenosis    Dizziness    Essential hypertension, benign    Obesity, Class I, BMI 30-34.9    History of ST elevation myocardial infarction (STEMI)       Past Surgical History:   Procedure Laterality Date    BREAST BIOPSY Left 1999    benign    BREAST BIOPSY Right 2004    rash     BREAST BIOPSY Right 2013    Right breast biopsy with biopsy of right breast skin    BREAST SURGERY Right 2013    Right simple mastectomy with SNB    CAROTID ENDARTERECTOMY Left     CHOLECYSTECTOMY  2000    CORONARY ANGIOPLASTY      2022    MASTECTOMY Right 2013    with axillary sentinel lymph node Bx     OTHER SURGICAL HISTORY  11/10/2014    MULT MASSES EXC CHEST WALL       Social History     Socioeconomic History    Marital status:      Spouse name: Not on file    Number of children: Not on file    Years of education: Not on file    Highest education level: Not on file   Occupational History    Not on file   Tobacco Use    Smoking status: Former Smoker     Packs/day: 1.00     Years: 30.00     Pack years: 30.00     Types: Cigarettes     Quit date: 1996     Years since quittin.0    Smokeless tobacco: Never Used   Substance and Sexual Activity    Alcohol use: Never    Drug use: Not on file    Sexual activity: Not on file   Other Topics Concern    Not on file   Social History Narrative    Not on file     Social Determinants of Health     Financial Resource Strain:     Difficulty of Paying Living Expenses: Not on file   Food Insecurity:     Worried About [address] in the Last Year: Not on file    [address] in the Last Year: Not on file   Transportation Needs:     Lack of Transportation (Medical): Not on file    Lack of Transportation (Non-Medical):  Not on file   Physical Activity:     Days of Exercise per Week: Not on file    Minutes of Exercise per Session: Not on file   Stress:     Feeling of Stress : Not on file   Social Connections:     Frequency of Communication with Friends and Family: Not on file    Frequency of Social Gatherings with Friends and Family: Not on file    Attends Anglican Services: Not on file    Active Member of Clubs or Organizations: Not on file    Attends Club or Organization Meetings: Not on file    Marital Status: Not on file   Intimate Partner Violence:     Fear of Current or Ex-Partner: Not on file    Emotionally Abused: Not on file    Physically Abused: Not on file    Sexually Abused: Not on file   Housing Stability:     Unable to Pay for Housing in the Last Year: Not on file    Number of [place] in the Last Year: Not on file    Unstable Housing in the Last Year: Not on file       Family History   Problem Relation Age of Onset    Cancer Mother         Melanoma       Current Outpatient Medications   Medication Sig Dispense Refill    losartan (COZAAR) 50 MG tablet Take 1 tablet by mouth daily 90 tablet 3    pravastatin (PRAVACHOL) 80 MG tablet Take 80 mg by mouth daily      metoprolol tartrate (LOPRESSOR) 50 MG tablet Take 1 tablet by mouth 2 times daily 60 tablet 3    pantoprazole (PROTONIX) 40 MG tablet Take 40 mg by mouth daily       aspirin 81 MG EC tablet Take 1 tablet by mouth daily 30 tablet 3    fluticasone (FLONASE) 50 MCG/ACT nasal spray 1 spray by Nasal route daily        No current facility-administered medications for this visit. Amoxicillin    Review of Systems:  General ROS: negative  Psychological ROS: negative  Hematological and Lymphatic ROS: No history of blood clots or bleeding disorder. Respiratory ROS: no cough, shortness of breath, or wheezing  Cardiovascular ROS: no chest pain or dyspnea on exertion  Gastrointestinal ROS: no abdominal pain, change in bowel habits, or black or bloody stools  Genito-Urinary ROS: no dysuria, trouble voiding, or hematuria  Musculoskeletal ROS: negative  Neurological ROS: positive for - dizziness  Dermatological ROS: negative    VITALS:  Blood pressure (!) 180/83, pulse 110, height 5' (1.524 m), weight 160 lb (72.6 kg), SpO2 98 %. Body mass index is 31.25 kg/m².     Physical Examination:  General appearance - alert, well appearing, and in no distress  Mental status - alert, oriented to person, place, and time  Neck - Neck is supple, no JVD or carotid bruits. No thyromegaly or adenopathy. Chest - clear to auscultation, no wheezes, rales or rhonchi, symmetric air entry  Heart - normal rate, regular rhythm, normal S1, S2, no murmurs, rubs, clicks or gallops  Abdomen - soft, nontender, nondistended, no masses or organomegaly  Neurological - alert, oriented, normal speech, no focal findings or movement disorder noted  Extremities - peripheral pulses normal, no pedal edema, no clubbing or cyanosis  Skin - normal coloration and turgor, no rashes, no suspicious skin lesions noted      EKG: normal sinus rhythm    No orders of the defined types were placed in this encounter. ASSESSMENT:     Diagnosis Orders   1. Primary hypertension     2. History of ST elevation myocardial infarction (STEMI)     3. Obesity, Class I, BMI 30-34.9     4. Mixed hyperlipidemia     5. Essential hypertension, benign     6. Bilateral carotid artery stenosis           PLAN:         As always, aggressive risk factor modification is strongly recommended. We should adhere to the JNC VIII guidelines for HTN management and the NCEP ATP III guidelines for LDL-C management. Cardiac diet is always recommended with low fat, cholesterol, calories and sodium. Continue medications at current doses. Change losartan to 50mg daily    Change brilinta to plavix 75mg daily    Will need NIURKA GREENFIELD in near future    Patient was advised and encouraged to check blood pressure at home or at a pharmacy, maintain a logbook, and also call us back if blood pressure are above the target ranges or if it is low. Patient clearly understands and agrees to the instructions. We will need to continue to monitor muscle and liver enzymes, BUN, CR, and electrolytes.

## 2022-01-27 NOTE — ED NOTES
WAITING CT, PT A&OX4, SKIN W/D/PINK, 0 PAIN, 0 SOB,0 DISTRESS, SPEECH CLEAR, 0 PROBLEMS, PT TALKING ON THE PHONE TO SISTER. WILL MONITOR.      Bryant Seaman RN  01/27/22 3600

## 2022-01-27 NOTE — ED NOTES
Discharge instructions discussed with patient. No further questions. IV removed, asymptomatic. Patient being transferred to Cardiology doctors appointment via wheelchair and transport. Education given to patient to be picked up after appointment.       Baldomero Lennox, RN  01/27/22 8789

## 2022-01-28 ENCOUNTER — CARE COORDINATION (OUTPATIENT)
Dept: CASE MANAGEMENT | Age: 72
End: 2022-01-28

## 2022-01-28 LAB
EKG ATRIAL RATE: 88 BPM
EKG P AXIS: 54 DEGREES
EKG P-R INTERVAL: 144 MS
EKG Q-T INTERVAL: 356 MS
EKG QRS DURATION: 80 MS
EKG QTC CALCULATION (BAZETT): 430 MS
EKG R AXIS: 33 DEGREES
EKG T AXIS: -16 DEGREES
EKG VENTRICULAR RATE: 88 BPM

## 2022-01-28 PROCEDURE — 93010 ELECTROCARDIOGRAM REPORT: CPT | Performed by: INTERNAL MEDICINE

## 2022-01-28 NOTE — CARE COORDINATION
Follow Up Call 1/12/2022 - 1/13/2022 [facility]. CV-19 (pos 1/12/22), STEMI, Psychosis, Heart catheterization. 1/27/2022 [facility] ([facility]) ED Dizziness, Elevated BP. Challenges to be reviewed by the provider   Additional needs identified to be addressed with provider: No  none                 Encounter was not routed to provider for escalation. Method of communication with provider:  none. Contacted the patient by telephone to follow up after ED. Status: improved. Reports she came to ED for increased dizziness and elevated SBP in 200 range. Today's SBP has been 140-150 and no further dizzy concerns. States she had a touch of nausea earlier today and one episode of loose stool. Discontinued Brilinta and is now on Plavix w/aspirin. Enc to take w/ food, v/u. In good spirits and denies any events of chest pain/pressure or palpitations. No edema or SOB. Interventions to address identified needs: Pt v/u of Acute MI symptoms to call 911. [facility] follow up appointment(s):   Future Appointments   Date Time Provider Fernie Radre   2/3/2022  2:00 PM DILIP Dai - CNP SHE[facility] follow up appointment(s):    Follow up appointment completed? Yes. Provided contact information for future needs. Plan for follow-up call in 5-7 days based on severity of symptoms and risk factors. Plan for next call: Dizziness, Check home BP.     Tricia Mascorro RN

## 2022-01-31 ENCOUNTER — CARE COORDINATION (OUTPATIENT)
Dept: CARE COORDINATION | Age: 72
End: 2022-01-31

## 2022-01-31 NOTE — CARE COORDINATION
[address] and left voicemail regarding Dietitian follow up. Left call back number and will follow up as appropriate.        [address] [facility], [address] [place] [address]

## 2022-01-31 NOTE — CARE COORDINATION
[place]  1/31/2022    Registered Dietitian Progress Note for Care Coordination    Assessment: Sunita Morales is a 66 y.o. female. RD referred for poor appetite. RD spoke with patient for initial nutrition assessment on 1/17. RD called to follow up with pt today 1/31. Pt states she is doing \"much better. \" RD discussed previous goals with pt. Patient explains she is eating breakfast, mid day snack, dinner and evening snack. Pt states for breakfast she is eating oatmeal, mid day snack she is eating an apple with peanut butter, dinner she is eating fish or skinless chicken with fruits and vegetables and in the evening she is eating almonds. Patient explains she has been incorporating a variety of foods and cutting out junk food. RD acknowledged this. Patient is not drinking [place] Breakfast Essentials. Pt states she is drinking a lot of water. Patient asked specifically about roast beef- RD explained roast beef is high in sodium and the importance of monitoring sodium intake for BP. Discussed the importance of portion control and moderation. RD acknowledged the awesome changes patient has made and discussed small changes will make a big impact. Pt verbalizes understanding. No nutrition related questions at this time. Nutrition Monitoring and Evaluation  Indicator/Goal Criteria Progress   #1 Eat small balanced snacks consistently throughout the day. #1 Incorporate a serving of protein and a serving of carbohydrate for snacks. #1 Patient is eating breakfast, mid day snack, dinner and evening snack. Plan of Care:  RD encouraged pt to keep working toward goals set. RD will follow up with pt to discuss any questions pt has and check the progress toward goals. Follow Up:    RD will call pt in 3 weeks to follow up and answer any nutrition related questions at that time.      [facility], [address] [facility]

## 2022-02-03 ENCOUNTER — CARE COORDINATION (OUTPATIENT)
Dept: CASE MANAGEMENT | Age: 72
End: 2022-02-03

## 2022-02-05 ENCOUNTER — HOSPITAL ENCOUNTER (EMERGENCY)
Age: 72
Discharge: HOME | End: 2022-02-05
Attending: EMERGENCY MEDICINE
Payer: MEDICARE

## 2022-02-05 VITALS
HEART RATE: 81 BPM | DIASTOLIC BLOOD PRESSURE: 60 MMHG | HEIGHT: 60 IN | TEMPERATURE: 98 F | OXYGEN SATURATION: 98 % | RESPIRATION RATE: 18 BRPM | WEIGHT: 160 LBS | BODY MASS INDEX: 31.41 KG/M2 | SYSTOLIC BLOOD PRESSURE: 130 MMHG

## 2022-02-05 DIAGNOSIS — I10 HYPERTENSION, UNSPECIFIED TYPE: Primary | ICD-10-CM

## 2022-02-05 PROCEDURE — 6370000000 HC RX 637 (ALT 250 FOR IP): Performed by: EMERGENCY MEDICINE

## 2022-02-05 PROCEDURE — 99285 EMERGENCY DEPT VISIT HI MDM: CPT

## 2022-02-05 RX ORDER — LOSARTAN POTASSIUM 50 MG/1
50 TABLET ORAL ONCE
Status: DISCONTINUED | OUTPATIENT
Start: 2022-02-05 | End: 2022-02-05 | Stop reason: HOSPADM

## 2022-02-05 RX ORDER — CARVEDILOL 12.5 MG/1
12.5 TABLET ORAL ONCE
Status: DISCONTINUED | OUTPATIENT
Start: 2022-02-05 | End: 2022-02-05 | Stop reason: HOSPADM

## 2022-02-05 RX ORDER — CLONIDINE HYDROCHLORIDE 0.1 MG/1
0.1 TABLET ORAL ONCE
Status: COMPLETED | OUTPATIENT
Start: 2022-02-05 | End: 2022-02-05

## 2022-02-05 RX ORDER — CARVEDILOL 12.5 MG/1
12.5 TABLET ORAL 2 TIMES DAILY
Qty: 28 TABLET | Refills: 0 | Status: SHIPPED | OUTPATIENT
Start: 2022-02-05 | End: 2022-02-10 | Stop reason: SDUPTHER

## 2022-02-05 RX ORDER — AMLODIPINE BESYLATE 10 MG/1
10 TABLET ORAL DAILY
Qty: 14 TABLET | Refills: 0 | Status: SHIPPED | OUTPATIENT
Start: 2022-02-05 | End: 2022-02-10 | Stop reason: SDUPTHER

## 2022-02-05 RX ADMIN — CLONIDINE HYDROCHLORIDE 0.1 MG: 0.1 TABLET ORAL at 17:40

## 2022-02-05 NOTE — ED NOTES
Bed: 08  Expected date:   Expected time:   Means of arrival:   Comments:     April Shahnaz Greer RN  02/05/22 6532

## 2022-02-05 NOTE — ED PROVIDER NOTES
[facility] ED  EMERGENCY DEPARTMENT ENCOUNTER      Pt Name: Cinthia Marcos  MRN: [id number]  [place] 1950  Date of evaluation: 2/5/2022  Provider: Megan Celis MD    CHIEF COMPLAINT       Chief Complaint   Patient presents with    Hypertension     pt states b/p at home was 211/102; on arrival b/p 184/81         HISTORY OF PRESENT ILLNESS   (Location/Symptom, Timing/Onset, Context/Setting, Quality, Duration, Modifying Factors, Severity)  Note limiting factors. 28-year-old female presenting with elevated blood pressure. Notes no specific complaints. Taking losartan and metoprolol and states she is compliant with her drug regimen. Denies chest pain, shortness of breath or dizziness specifically. Called cardiologist on call who recommended she come in and be treated here. Nursing Notes were reviewed. REVIEW OF SYSTEMS    (2-9 systems for level 4, 10 or more for level 5)     Review of Systems   All other systems reviewed and are negative. Except as noted above the remainder of the review of systems was reviewed and negative.        PAST MEDICAL HISTORY     Past Medical History:   Diagnosis Date    Anxiety     Breast cancer ([facility] [address]     Depression     Gallbladder disease     cholecystitis    GERD (gastroesophageal reflux disease)     Heart attack ([facility] [address]     01-    History of ST elevation myocardial infarction (STEMI) 1/27/2022    Hypercholesterolemia     Hyperlipidemia     Hypertension     TIA (transient ischemic attack)          SURGICAL HISTORY       Past Surgical History:   Procedure Laterality Date    BREAST BIOPSY Left 1999    benign    BREAST BIOPSY Right 2004    rash     BREAST BIOPSY Right 02/14/2013    Right breast biopsy with biopsy of right breast skin    BREAST SURGERY Right 02/27/2013    Right simple mastectomy with SNB    CAROTID ENDARTERECTOMY Left     CHOLECYSTECTOMY  2000    CORONARY ANGIOPLASTY      01-    MASTECTOMY Right 02/2013 with axillary sentinel lymph node Bx     OTHER SURGICAL HISTORY  11/10/2014    MULT MASSES EXC CHEST WALL         CURRENT MEDICATIONS       Previous Medications    ASPIRIN 81 MG EC TABLET    Take 1 tablet by mouth daily    CLOPIDOGREL (PLAVIX) 75 MG TABLET    Take 1 tablet by mouth daily    FLUTICASONE (FLONASE) 50 MCG/ACT NASAL SPRAY    1 spray by Nasal route daily     LOSARTAN (COZAAR) 50 MG TABLET    Take 1 tablet by mouth daily    PANTOPRAZOLE (PROTONIX) 40 MG TABLET    Take 40 mg by mouth daily     PRAVASTATIN (PRAVACHOL) 80 MG TABLET    Take 80 mg by mouth daily       ALLERGIES     Amoxicillin    FAMILY HISTORY       Family History   Problem Relation Age of Onset    Cancer Mother         Melanoma          SOCIAL HISTORY       Social History     Socioeconomic History    Marital status:      Spouse name: None    Number of children: None    Years of education: None    Highest education level: None   Occupational History    None   Tobacco Use    Smoking status: Former Smoker     Packs/day: 1.00     Years: 30.00     Pack years: 30.00     Types: Cigarettes     Quit date: 1996     Years since quittin.1    Smokeless tobacco: Never Used   Vaping Use    Vaping Use: Never used   Substance and Sexual Activity    Alcohol use: Never    Drug use: Never    Sexual activity: Not Currently   Other Topics Concern    None   Social History Narrative    None     Social Determinants of Health     Financial Resource Strain:     Difficulty of Paying Living Expenses: Not on file   Food Insecurity:     Worried About Running Out of Food in the Last Year: Not on file    Darek of Food in the Last Year: Not on file   Transportation Needs:     Lack of Transportation (Medical): Not on file    Lack of Transportation (Non-Medical):  Not on file   Physical Activity:     Days of Exercise per Week: Not on file    Minutes of Exercise per Session: Not on file   Stress:     Feeling of Stress : Not on file Social Connections:     Frequency of Communication with Friends and Family: Not on file    Frequency of Social Gatherings with Friends and Family: Not on file    Attends Mormon Services: Not on file    Active Member of Clubs or Organizations: Not on file    Attends Club or Organization Meetings: Not on file    Marital Status: Not on file   Intimate Partner Violence:     Fear of Current or Ex-Partner: Not on file    Emotionally Abused: Not on file    Physically Abused: Not on file    Sexually Abused: Not on file   Housing Stability:     Unable to Pay for Housing in the Last Year: Not on file    Number of [place] in the Last Year: Not on file    Unstable Housing in the Last Year: Not on file       SCREENINGS    [place] Coma Scale  Eye Opening: Spontaneous  Best Verbal Response: Oriented  Best Motor Response: Obeys commands  [place] Coma Scale Score: 15          PHYSICAL EXAM    (up to 7 for level 4, 8 or more for level 5)     ED Triage Vitals [02/05/22 1720]   BP Temp Temp Source Pulse Resp SpO2 Height Weight   (!) 184/81 98 °F (36.7 °C) Oral 94 16 100 % 5' (1.524 m) 160 lb (72.6 kg)       Physical Exam  Vitals and nursing note reviewed. Constitutional:       General: She is not in acute distress. Appearance: Normal appearance. She is well-developed. She is not ill-appearing. HENT:      Head: Normocephalic and atraumatic. Mouth/Throat:      Mouth: Mucous membranes are moist.      Pharynx: Oropharynx is clear. Eyes:      Extraocular Movements: Extraocular movements intact. Conjunctiva/sclera: Conjunctivae normal.   Cardiovascular:      Rate and Rhythm: Normal rate and regular rhythm. Pulmonary:      Effort: Pulmonary effort is normal.      Breath sounds: Normal breath sounds. Abdominal:      General: Bowel sounds are normal.      Palpations: Abdomen is soft. Tenderness: There is no abdominal tenderness. Musculoskeletal:         General: No deformity.  Normal range of motion. Cervical back: Normal range of motion and neck supple. Skin:     General: Skin is warm and dry. Capillary Refill: Capillary refill takes less than 2 seconds. Neurological:      General: No focal deficit present. Mental Status: She is alert and oriented to person, place, and time. Mental status is at baseline. Cranial Nerves: No cranial nerve deficit. Psychiatric:         Thought Content: Thought content normal.         DIAGNOSTIC RESULTS     EKG: All EKG's are interpreted by the Emergency Department Physician who either signs or Co-signs this chart in the absence of a cardiologist.    NSR, rate 92, normal intervals, no ST elevation/ depression    RADIOLOGY:   Non-plain film images such as CT, Ultrasound and MRI are read by the radiologist. Plain radiographic images are visualized and preliminarily interpreted by the emergency physician with the below findings:    Interpretation per the Radiologist below, if available at the time of this note:    No orders to display       LABS:  Labs Reviewed - No data to display    All other labs were within normal range or not returned as of this dictation. EMERGENCY DEPARTMENT COURSE and DIFFERENTIAL DIAGNOSIS/MDM:   Vitals:    Vitals:    02/05/22 1720 02/05/22 1740 02/05/22 1800   BP: (!) 184/81 (!) 189/76 (!) 175/74   Pulse: 94  86   Resp: 16  18   Temp: 98 °F (36.7 °C)     TempSrc: Oral     SpO2: 100%  98%   Weight: 160 lb (72.6 kg)     Height: 5' (1.524 m)         MDM  Number of Diagnoses or Management Options  Hypertension, unspecified type  Diagnosis management comments: Presents with elevated blood pressure. Given clonidine with improvement in pressures noted. Spoke to Dr. Edil duke, on-call for cardiology who recommends that patient stop metoprolol, increase losartan to 100 mg daily, and start Coreg 12.5mg BID and amlodipine 10 mg daily. Patient will be discharged home in good condition.   Patient has been hemodynamically stable throughout ED course and is appropriate for outpatient follow up. Patient should follow up with cardiology in 2-3 days or return to ED immediately for any new or worsening symptoms. Patient is well appearing on discharge and agreeable with plan of care. Procedures    CRITICAL CARE TIME   Total Critical Care time was 0 minutes, excluding separately reportable procedures. There was a high probability of clinically significant/life threatening deterioration in the patient's condition which required my urgent intervention. FINAL IMPRESSION      1.  Hypertension, unspecified type          DISPOSITION/PLAN   DISPOSITION Discharge - Pending Orders Complete 02/05/2022 06:29:50 PM      (Please note that portions of this note were completed with a voice recognition program.  Efforts were made to edit the dictations but occasionally words are mis-transcribed.)    Britney Leach MD (electronically signed)  Attending Emergency Physician        Britney Leach MD  02/05/22 9002

## 2022-02-05 NOTE — ED TRIAGE NOTES
Pt presents to ED via EMS c/o HTN. Pt states that she takes her b/p TID. At 1400, pt states b/p was 211/102. Pt then repeatedly checked her b/p over the next 3h and it remained high. Pt has hx of anxiety. Pt appears anxious on arrival; states she has had carotid stenosis and a heart attack \"last month\". Pt called physician on-call. Per pt, Dr. Fransico Foster encouraged her to come to ED. On arrival, pt b/p 184/81. Pt denies CP, SOB, dizziness. Pt endorses \"slight HA\" that resolved PTA.

## 2022-02-06 NOTE — ED NOTES
Pt understands discharge instructions.   Pt instructed to follow up with PCP   Prescriptions explained   Pt told to come back for new or worsening symptoms  No further questions         Shreya Stockton RN  02/05/22 4724

## 2022-02-07 ENCOUNTER — TELEPHONE (OUTPATIENT)
Dept: CARDIOLOGY CLINIC | Age: 72
End: 2022-02-07

## 2022-02-07 ENCOUNTER — CARE COORDINATION (OUTPATIENT)
Dept: CASE MANAGEMENT | Age: 72
End: 2022-02-07

## 2022-02-07 NOTE — TELEPHONE ENCOUNTER
Patient calling. She was in the ER over the weekend for HTN. She has follow up with Andrew Weinberg CNP on Thursday 2-. Patient calling for sooner appt, but can only come in the afternoon. Patient made aware that Dr Hay Rhoades does not have afternoon appt on Tuesday. She is to return to ER is symptoms worsen before her appt.

## 2022-02-07 NOTE — CARE COORDINATION
Follow Up Call 1/12/2022 - 1/13/2022 [facility]. CV-19 (pos 1/12/22), STEMI, Psychosis, Heart catheterization. 1/27/2022 [facility]) ED Dizziness, Elevated BP. 2/5/2022 [facility]) ED. HTN. Initial ED outreach leaving Hippa DASH w/ reminder to schedule PCP appt. To see DAMIEN Glasgow CNP 2/10 1:15.     Krista Mosher RN

## 2022-02-08 ENCOUNTER — CARE COORDINATION (OUTPATIENT)
Dept: CASE MANAGEMENT | Age: 72
End: 2022-02-08

## 2022-02-08 DIAGNOSIS — I10 PRIMARY HYPERTENSION: Primary | ICD-10-CM

## 2022-02-08 NOTE — CARE COORDINATION
[facility] ED Follow Up Call    2022    Patient: Stefany Rashid Patient : 1950   MRN: [id number]  Reason for Admission: 2022 - 2022 [facility]. CV-19 (pos 22), STEMI, Psychosis, Heart catheterization. 2022 [facility]) ED Dizziness, Elevated BP. 2022 [facility]) ED. HTN. [place] reports she outreached her PCP office today w/ med request to help w/ anxiety. She is waiting return call. Has c/o intermittent dizziness/lightheadedness. Usually w/ repositioning or turning too fast. Reviewed causes of anxiety and pt anxious for pending right carotid intervention by Dr Emilia Jarquin. States right carotid has 80% stenosis. No HA or flushing concerns. States she had left [facility] at [facility] and later suffered MI after getting covid. States some home SBPs in high 190-200 range and fearful of another acute MI. She takes her meds as directed. Reviewed to check her home BP daily to log for physician review and discussed activities to help reduce stress, v/u. Denies any C needs. To see DAMIEN Glasgow CNP 2/10 1:15. Care Transitions ED Follow Up    Care Transitions Interventions  Are there any other complaints/concerns that you wish to tell your provider?: Pt reports she was having high home BPs and fearful of a second MI. Waiting on R carotid intervention w/ Dr Emilia Jarquin. Do you understand what to report and when to return?: Yes   Are you following your discharge instructions?: Yes   Do you have all of your prescriptions and are they filled?: Yes   Post Acute Services:  Outpatient/Community Services (Comment: Cardiac Rehab)

## 2022-02-10 ENCOUNTER — OFFICE VISIT (OUTPATIENT)
Dept: CARDIOLOGY CLINIC | Age: 72
End: 2022-02-10
Payer: MEDICARE

## 2022-02-10 VITALS
BODY MASS INDEX: 31.21 KG/M2 | HEART RATE: 74 BPM | SYSTOLIC BLOOD PRESSURE: 146 MMHG | DIASTOLIC BLOOD PRESSURE: 78 MMHG | RESPIRATION RATE: 16 BRPM | WEIGHT: 159.8 LBS | OXYGEN SATURATION: 98 %

## 2022-02-10 DIAGNOSIS — I21.11 ST ELEVATION MYOCARDIAL INFARCTION INVOLVING RIGHT CORONARY ARTERY (HCC): ICD-10-CM

## 2022-02-10 DIAGNOSIS — I65.23 BILATERAL CAROTID ARTERY STENOSIS: ICD-10-CM

## 2022-02-10 DIAGNOSIS — E78.2 MIXED HYPERLIPIDEMIA: ICD-10-CM

## 2022-02-10 DIAGNOSIS — R42 DIZZINESS: ICD-10-CM

## 2022-02-10 DIAGNOSIS — I10 PRIMARY HYPERTENSION: Primary | ICD-10-CM

## 2022-02-10 LAB
EKG ATRIAL RATE: 92 BPM
EKG P AXIS: 67 DEGREES
EKG P-R INTERVAL: 172 MS
EKG Q-T INTERVAL: 366 MS
EKG QRS DURATION: 90 MS
EKG QTC CALCULATION (BAZETT): 452 MS
EKG R AXIS: 56 DEGREES
EKG T AXIS: -9 DEGREES
EKG VENTRICULAR RATE: 92 BPM

## 2022-02-10 PROCEDURE — G8417 CALC BMI ABV UP PARAM F/U: HCPCS | Performed by: NURSE PRACTITIONER

## 2022-02-10 PROCEDURE — 1090F PRES/ABSN URINE INCON ASSESS: CPT | Performed by: NURSE PRACTITIONER

## 2022-02-10 PROCEDURE — G8484 FLU IMMUNIZE NO ADMIN: HCPCS | Performed by: NURSE PRACTITIONER

## 2022-02-10 PROCEDURE — 3017F COLORECTAL CA SCREEN DOC REV: CPT | Performed by: NURSE PRACTITIONER

## 2022-02-10 PROCEDURE — G8400 PT W/DXA NO RESULTS DOC: HCPCS | Performed by: NURSE PRACTITIONER

## 2022-02-10 PROCEDURE — 1123F ACP DISCUSS/DSCN MKR DOCD: CPT | Performed by: NURSE PRACTITIONER

## 2022-02-10 PROCEDURE — 4040F PNEUMOC VAC/ADMIN/RCVD: CPT | Performed by: NURSE PRACTITIONER

## 2022-02-10 PROCEDURE — 1036F TOBACCO NON-USER: CPT | Performed by: NURSE PRACTITIONER

## 2022-02-10 PROCEDURE — 1111F DSCHRG MED/CURRENT MED MERGE: CPT | Performed by: NURSE PRACTITIONER

## 2022-02-10 PROCEDURE — 99213 OFFICE O/P EST LOW 20 MIN: CPT | Performed by: NURSE PRACTITIONER

## 2022-02-10 PROCEDURE — G8427 DOCREV CUR MEDS BY ELIG CLIN: HCPCS | Performed by: NURSE PRACTITIONER

## 2022-02-10 RX ORDER — SODIUM CHLORIDE 0.9 % (FLUSH) 0.9 %
5-40 SYRINGE (ML) INJECTION EVERY 12 HOURS SCHEDULED
Status: CANCELLED | OUTPATIENT
Start: 2022-02-10

## 2022-02-10 RX ORDER — NITROGLYCERIN 0.4 MG/1
0.4 TABLET SUBLINGUAL EVERY 5 MIN PRN
Status: CANCELLED | OUTPATIENT
Start: 2022-02-10

## 2022-02-10 RX ORDER — PREDNISONE 1 MG/1
50 TABLET ORAL ONCE
Status: CANCELLED | OUTPATIENT
Start: 2022-02-10 | End: 2022-02-10

## 2022-02-10 RX ORDER — ONDANSETRON 2 MG/ML
4 INJECTION INTRAMUSCULAR; INTRAVENOUS EVERY 6 HOURS PRN
Status: CANCELLED | OUTPATIENT
Start: 2022-02-10

## 2022-02-10 RX ORDER — CARVEDILOL 12.5 MG/1
12.5 TABLET ORAL 2 TIMES DAILY
Qty: 180 TABLET | Refills: 1 | Status: SHIPPED | OUTPATIENT
Start: 2022-02-10

## 2022-02-10 RX ORDER — SODIUM CHLORIDE 9 MG/ML
25 INJECTION, SOLUTION INTRAVENOUS PRN
Status: CANCELLED | OUTPATIENT
Start: 2022-02-10

## 2022-02-10 RX ORDER — DIPHENHYDRAMINE HCL 25 MG
50 TABLET ORAL ONCE
Status: CANCELLED | OUTPATIENT
Start: 2022-02-10 | End: 2022-02-10

## 2022-02-10 RX ORDER — SODIUM CHLORIDE 9 MG/ML
INJECTION, SOLUTION INTRAVENOUS CONTINUOUS
Status: CANCELLED | OUTPATIENT
Start: 2022-02-10

## 2022-02-10 RX ORDER — AMLODIPINE BESYLATE 10 MG/1
10 TABLET ORAL DAILY
Qty: 90 TABLET | Refills: 1 | Status: SHIPPED | OUTPATIENT
Start: 2022-02-10

## 2022-02-10 RX ORDER — LOSARTAN POTASSIUM 100 MG/1
100 TABLET ORAL DAILY
COMMUNITY
End: 2022-03-16 | Stop reason: SDUPTHER

## 2022-02-10 RX ORDER — SODIUM CHLORIDE 0.9 % (FLUSH) 0.9 %
5-40 SYRINGE (ML) INJECTION PRN
Status: CANCELLED | OUTPATIENT
Start: 2022-02-10

## 2022-02-10 NOTE — PATIENT INSTRUCTIONS
NO LOSARTAN, CARVEDILOL, AMLODIPINE THE MORNING OF YOUR PROCEDURE      DASH Diet: Care Instructions  Your Care Instructions     The DASH diet is an eating plan that can help lower your blood pressure. DASH stands for Dietary Approaches to Stop Hypertension. Hypertension is high blood pressure. The DASH diet focuses on eating foods that are high in calcium, potassium, and magnesium. These nutrients can lower blood pressure. The foods that are highest in these nutrients are fruits, vegetables, low-fat dairy products, nuts, seeds, and legumes. But taking calcium, potassium, and magnesium supplements instead of eating foods that are high in those nutrients does not have the same effect. The DASH diet also includes whole grains, fish, and poultry. The DASH diet is one of several lifestyle changes your doctor may recommend to lower your high blood pressure. Your doctor may also want you to decrease the amount of sodium in your diet. Lowering sodium while following the DASH diet can lower blood pressure even further than just the DASH diet alone. Follow-up care is a key part of your treatment and safety. Be sure to make and go to all appointments, and call your doctor if you are having problems. It's also a good idea to know your test results and keep a list of the medicines you take. How can you care for yourself at home? Following the DASH diet  · Eat 4 to 5 servings of fruit each day. A serving is 1 medium-sized piece of fruit, ½ cup chopped or canned fruit, 1/4 cup dried fruit, or 4 ounces (½ cup) of fruit juice. Choose fruit more often than fruit juice. · Eat 4 to 5 servings of vegetables each day. A serving is 1 cup of lettuce or raw leafy vegetables, ½ cup of chopped or cooked vegetables, or 4 ounces (½ cup) of vegetable juice. Choose vegetables more often than vegetable juice. · Get 2 to 3 servings of low-fat and fat-free dairy each day.  A serving is 8 ounces of milk, 1 cup of yogurt, or 1 ½ ounces of cheese. · Eat 6 to 8 servings of grains each day. A serving is 1 slice of bread, 1 ounce of dry cereal, or ½ cup of cooked rice, pasta, or cooked cereal. Try to choose whole-grain products as much as possible. · Limit lean meat, poultry, and fish to 2 servings each day. A serving is 3 ounces, about the size of a deck of cards. · Eat 4 to 5 servings of nuts, seeds, and legumes (cooked dried beans, lentils, and split peas) each week. A serving is 1/3 cup of nuts, 2 tablespoons of seeds, or ½ cup of cooked beans or peas. · Limit fats and oils to 2 to 3 servings each day. A serving is 1 teaspoon of vegetable oil or 2 tablespoons of salad dressing. · Limit sweets and added sugars to 5 servings or less a week. A serving is 1 tablespoon jelly or jam, ½ cup sorbet, or 1 cup of lemonade. · Eat less than 2,300 milligrams (mg) of sodium a day. If you limit your sodium to 1,500 mg a day, you can lower your blood pressure even more. · Be aware that all of these are the suggested number of servings for people who eat 1,800 to 2,000 calories a day. Your recommended number of servings may be different if you need more or fewer calories. Tips for success  1. Start small. Do not try to make dramatic changes to your diet all at once. You might feel that you are missing out on your favorite foods and then be more likely to not follow the plan. Make small changes, and stick with them. Once those changes become habit, add a few more changes. 2. Try some of the following:  ? Make it a goal to eat a fruit or vegetable at every meal and at snacks. This will make it easy to get the recommended amount of fruits and vegetables each day. ? Try yogurt topped with fruit and nuts for a snack or healthy dessert. ? Add lettuce, tomato, cucumber, and onion to sandwiches. ? Combine a ready-made pizza crust with low-fat mozzarella cheese and lots of vegetable toppings.  Try using tomatoes, squash, spinach, broccoli, carrots, cauliflower, and onions. ? Have a variety of cut-up vegetables with a low-fat dip as an appetizer instead of chips and dip. ? Sprinkle sunflower seeds or chopped almonds over salads. Or try adding chopped walnuts or almonds to cooked vegetables. ? Try some vegetarian meals using beans and peas. Add garbanzo or kidney beans to salads. Make burritos and tacos with mashed griffith beans or black beans. Where can you learn more? Go to https://[employer]-One.Navegg. org and sign in to your 24/7 Card account. Enter W861 in the 2nd Watch box to learn more about \"DASH Diet: Care Instructions. \"     If you do not have an account, please click on the \"Sign Up Now\" link. Current as of: April 29, 2021               Content Version: 13.0  © 2006-2021 [employer]. Care instructions adapted under license by [facility] ([facility]). If you have questions about a medical condition or this instruction, always ask your healthcare professional. [place] [address] any warranty or liability for your use of this information. Patient Education        Learning About Carotid Angiogram  What is a carotid angiogram?     A carotid angiogram is an X-ray test to look at blood flow in the carotid arteries. These are the large blood vessels in your neck that carry blood to your brain. Your doctor will put a thin, flexible tube (catheter) into a blood vessel in your groin or arm. The catheter is used to put a dye into the carotid artery to see the blood flow. You may have this test to see if a carotid artery is narrowed. How is it done? A carotid angiogram is done in a catheterization laboratory (\"cath lab\"). You lie on a table under a large X-ray machine. You will get medicine through a tube (IV) in one of your veins to help you relax and not feel pain. You will be awake during the procedure. But you may not be able to remember much about it.   Your doctor will put some medicine into your arm or groin to numb the skin. You will feel a small needle stick, like having a blood test. You may feel some pressure when your doctor puts in the catheter, but you will not feel pain. Your doctor will look at X-ray pictures on a monitor to move the catheter to your neck. You may feel warm or flushed for a short time when your doctor puts dye into your artery. If you have a narrowed artery, the doctor may use the catheter to insert a stent into the artery. The stent helps keep the artery open. What happens after the procedure? The catheter will be removed. A nurse may press on a bandage on the opening to prevent bleeding. A small device may also be used to close the blood vessel. The area may be covered with a bandage or a compression device. You will be taken to a room where the catheter site and your heart rate, blood pressure, and temperature will be checked several times. If the catheter was put in your groin, you will need to lie still and keep your leg straight for up to a few hours. The nurse may put a weighted bag on your leg to keep it still. If the catheter was put in your arm, you may be able to sit up right away. But you will need to keep your arm still for at least 1 hour. Don't do anything strenuous until your doctor says it is okay. This may be for several days. Follow-up care is a key part of your treatment and safety. Be sure to make and go to all appointments, and call your doctor if you are having problems. It's also a good idea to know your test results and keep a list of the medicines you take. Where can you learn more? Go to https://ACB ([place] Limitedinessa.Trumba Corporation. org and sign in to your PhotoSolar account. Enter Q261 in the [facility] box to learn more about \"Learning About Carotid Angiogram.\"     If you do not have an account, please click on the \"Sign Up Now\" link. Current as of: July 6, 2021               Content Version: 13.1  © 0692-7926 Healthwise, Incorporated.    Care instructions adapted under license by [facility] ([facility]). If you have questions about a medical condition or this instruction, always ask your healthcare professional. [address] any warranty or liability for your use of this information.

## 2022-02-10 NOTE — PROGRESS NOTES
Chief Complaint   Patient presents with    Follow-up     BP check    Hypertension    Hyperlipidemia    Dizziness     s/p covid    Shortness of Breath     s/p covid            1-12-22: Patient is a 66 y.o. female who presents with a chief complaint of CP. Patient is followed on a regular basis by Dr. Jerrell Deleon MD.  Patient presented with very typical anginal symptoms and noted to have ST elevation MI on the EKG and code purple was activated. Patient with history of hypertension hyperlipidemia and carotid artery disease status post recent left carotid endarterectomy. She denies history of diabetes or tobacco use. She quit in the 1990s. 1-27-22: Patient presents for initial medical evaluation. Patient is followed on a regular basis by Dr. Jerrell Deleon MD. S/p STEMI and cath with 99% prox RCA with YOLIS, 50% mid LAD, mild CX disease, normal LVF. On DAPT and Brilinta is expensive. Developed dizziness today . She went to ER and BP was elevated in 200's. 180's in the office. Pt denies chest pain, dyspnea, dyspnea on exertion, change in exercise capacity, fatigue,  nausea, vomiting, diarrhea, constipation, motor weakness, insomnia, weight loss, synco, palpitations, PND, orthopnea, or claudication. Hx of Carotid disease s/p left CEA, s/p CTA of neck today with at least  75% NIURKA  EKG with T wave inversion in inferior wall. Likely from recent STEMI of RCA.     2/10/2022: S/p STEMI and cath with 99% prox RCA with YOLIS, 50% mid LAD, mild CX disease, normal LVF. Hx of Carotid disease s/p left CEA, s/p CTA of neck today with at least  75% NIURKA  EKG with T wave inversion in inferior wall. Likely from recent STEMI of RCA. Patient recently seen in ED on 2/5/2022 for hypertension and dizziness. ED physician spoke with on-call cardiology, , who adjusted patient's medications. Patient is now taking losartan 100 mg daily, Coreg 12.5 mg twice daily and amlodipine 10 mg daily.   Blood pressure in office today 146/78. Patient states dizziness is moderately improved. Admits that she has significant amount of anxiety. States she contacted her PCP and they are calling her in medication for anxiety. Patient's BP in office today 146/78. Patient states she checks her blood pressure two times a day at home and her systolics have been in the 152O and diastolics 19Y to 58Z. Pt denies chest pain, dyspnea, dyspnea on exertion, change in exercise capacity, fatigue,  nausea, vomiting, diarrhea, constipation, motor weakness, insomnia, weight loss, syncope, palpitations, PND, orthopnea, or claudication. No bleeding issues. Pt denies any angina or CHF type symptoms. No recent hospitalizations. Pt is compliant with all Rx medications. Blood pressure and heart rate are under control.              Patient Active Problem List   Diagnosis    Hyperlipidemia    Hypertension    GERD (gastroesophageal reflux disease)    Breast cancer ([facility] [address]    Fat necrosis    ST elevation myocardial infarction involving right coronary artery (HCC)    Bilateral carotid artery stenosis    Dizziness    Essential hypertension, benign    Obesity, Class I, BMI 30-34.9    History of ST elevation myocardial infarction (STEMI)       Past Surgical History:   Procedure Laterality Date    BREAST BIOPSY Left 1999    benign    BREAST BIOPSY Right 2004    rash     BREAST BIOPSY Right 02/14/2013    Right breast biopsy with biopsy of right breast skin    BREAST SURGERY Right 02/27/2013    Right simple mastectomy with SNB    CAROTID ENDARTERECTOMY Left     CHOLECYSTECTOMY  2000    CORONARY ANGIOPLASTY      01-    CORONARY ANGIOPLASTY WITH STENT PLACEMENT  01/12/2022    DIAGNOSTIC CARDIAC CATH LAB PROCEDURE  01/12/2022    MASTECTOMY Right 02/2013    with axillary sentinel lymph node Bx     OTHER SURGICAL HISTORY  11/10/2014    MULT MASSES EXC CHEST WALL       Social History     Socioeconomic History    Marital status:  Spouse name: None    Number of children: None    Years of education: None    Highest education level: None   Occupational History    None   Tobacco Use    Smoking status: Former Smoker     Packs/day: 1.00     Years: 30.00     Pack years: 30.00     Types: Cigarettes     Quit date: 1996     Years since quittin.1    Smokeless tobacco: Never Used   Vaping Use    Vaping Use: Never used   Substance and Sexual Activity    Alcohol use: Never    Drug use: Never    Sexual activity: Not Currently   Other Topics Concern    None   Social History Narrative    None     Social Determinants of Health     Financial Resource Strain:     Difficulty of Paying Living Expenses: Not on file   Food Insecurity:     Worried About Running Out of Food in the Last Year: Not on file    Darek of Food in the Last Year: Not on file   Transportation Needs:     Lack of Transportation (Medical): Not on file    Lack of Transportation (Non-Medical):  Not on file   Physical Activity:     Days of Exercise per Week: Not on file    Minutes of Exercise per Session: Not on file   Stress:     Feeling of Stress : Not on file   Social Connections:     Frequency of Communication with Friends and Family: Not on file    Frequency of Social Gatherings with Friends and Family: Not on file    Attends Sabianism Services: Not on file    Active Member of [address] [place] [address] [employer] or Organizations: Not on file    Attends Club or Organization Meetings: Not on file    Marital Status: Not on file   Intimate Partner Violence:     Fear of Current or Ex-Partner: Not on file    Emotionally Abused: Not on file    Physically Abused: Not on file    Sexually Abused: Not on file   Housing Stability:     Unable to Pay for Housing in the Last Year: Not on file    Number of [place] in the Last Year: Not on file    Unstable Housing in the Last Year: Not on file       Family History   Problem Relation Age of Onset    Cancer Mother         Melanoma       Current Outpatient Medications   Medication Sig Dispense Refill    losartan (COZAAR) 100 MG tablet Take 100 mg by mouth daily      amLODIPine (NORVASC) 10 MG tablet Take 1 tablet by mouth daily 90 tablet 1    carvedilol (COREG) 12.5 MG tablet Take 1 tablet by mouth 2 times daily 180 tablet 1    clopidogrel (PLAVIX) 75 MG tablet Take 1 tablet by mouth daily 90 tablet 3    pravastatin (PRAVACHOL) 80 MG tablet Take 80 mg by mouth daily      aspirin 81 MG EC tablet Take 1 tablet by mouth daily 30 tablet 3    fluticasone (FLONASE) 50 MCG/ACT nasal spray 1 spray by Nasal route daily       pantoprazole (PROTONIX) 40 MG tablet Take 40 mg by mouth daily        No current facility-administered medications for this visit. Amoxicillin    Review of Systems:  General ROS: negative  Psychological ROS: negative  Hematological and Lymphatic ROS: No history of blood clots or bleeding disorder. Respiratory ROS: no cough, shortness of breath, or wheezing  Cardiovascular ROS: no chest pain or dyspnea on exertion  Gastrointestinal ROS: no abdominal pain, change in bowel habits, or black or bloody stools  Genito-Urinary ROS: no dysuria, trouble voiding, or hematuria  Musculoskeletal ROS: negative  Neurological ROS: positive for - dizziness  Dermatological ROS: negative    VITALS:  Blood pressure (!) 146/78, pulse 74, resp. rate 16, weight 159 lb 12.8 oz (72.5 kg), SpO2 98 %. Body mass index is 31.21 kg/m². Physical Examination:  General appearance - alert, well appearing, and in no distress  Mental status - alert, oriented to person, place, and time  Neck - Neck is supple, no JVD or carotid bruits. No thyromegaly or adenopathy.    Chest - clear to auscultation, no wheezes, rales or rhonchi, symmetric air entry  Heart - normal rate, regular rhythm, normal S1, S2, no murmurs, rubs, clicks or gallops  Abdomen - soft, nontender, nondistended, no masses or organomegaly  Neurological - alert, oriented, normal speech, no focal findings or movement disorder noted  Extremities - peripheral pulses normal, no pedal edema, no clubbing or cyanosis  Skin - normal coloration and turgor, no rashes, no suspicious skin lesions noted      EKG: normal sinus rhythm    Orders Placed This Encounter   Procedures    Initiate PAT Protocol       ASSESSMENT:     Diagnosis Orders   1. Primary hypertension  amLODIPine (NORVASC) 10 MG tablet    carvedilol (COREG) 12.5 MG tablet   2. Bilateral carotid artery stenosis     3. Dizziness           PLAN:         As always, aggressive risk factor modification is strongly recommended. We should adhere to the JNC VIII guidelines for HTN management and the NCEP ATP III guidelines for LDL-C management. Cardiac diet is always recommended with low fat, cholesterol, calories and sodium. Continue current medicationsNorvasc 10 mg daily, aspirin 81 daily, Coreg 12.5 twice daily, Plavix 75 mg daily, losartan 100 mg daily    Will need NIURKA JEAN PIERRE in near future -we will plan carotid angio with Dr. Agnieszka Fernando    Patient was advised and encouraged to check blood pressure at home or at a pharmacy, maintain a logbook, and also call us back if blood pressure are above the target ranges or if it is low. Patient clearly understands and agrees to the instructions. We will need to continue to monitor muscle and liver enzymes, BUN, CR, and electrolytes.

## 2022-02-18 ENCOUNTER — TELEPHONE (OUTPATIENT)
Dept: CARDIOLOGY CLINIC | Age: 72
End: 2022-02-18

## 2022-02-21 ENCOUNTER — CARE COORDINATION (OUTPATIENT)
Dept: CARE COORDINATION | Age: 72
End: 2022-02-21

## 2022-02-21 NOTE — CARE COORDINATION
[place]  2/21/2022    Registered Dietitian Progress Note for Care Coordination    Assessment: Hilario Huang is a 66 y.o. female. RD referred for poor appetite. RD spoke with patient for initial nutrition assessment on 1/17 and has been following up with patient. RD called to follow up with pt today 2/21. RD discussed previous goals with pt. Patient states she is trying to eat better. Patient explains for breakfast she is eating oatmeal, in the afternoon she is eating apple with peanut butter and then she cooks dinner. RD discussed the importance of watching sodium intake closely to help manage BP. Explained the importance of avoiding the salt shaker and reading food labels closely. Discussed choosing foods with less than 300 mg of sodium per serving. Recommended buying low sodium peanut butter. Pt has no nutrition related questions at this time. RD offered to follow up with patient in a few weeks, pt accepted and very appreciative of follow up calls. Nutrition Monitoring and Evaluation  Indicator/Goal Criteria Progress   #1 Eat small balanced snacks consistently throughout the day. #1 Incorporate a serving of protein and a serving of carbohydrate for snacks. #1 Pt is eating breakfast, mid day snack and dinner. #2  Monitor sodium intake daily. Limit sodium from food and beverages to no more than 2000 mg/day. #2 Avoid the salt shaker and read food labels closely. #2 New goal set today 2/21     Plan of Care:  RD encouraged pt to keep working toward goals set. RD will follow up with pt to discuss any questions pt has and check the progress toward goals. Follow Up:    RD will call pt in 3 weeks to follow up and answer any nutrition related questions at that time.      [address] [facility], [address] [place] [address]

## 2022-02-24 NOTE — TELEPHONE ENCOUNTER
Medical therapy only if she does not want carotid angio/stenting at this time. Taking Plavix/ASA and statin. But still risk of CVA  She should have a follow up in 3 months from last office visit since she is not going through with procedure.

## 2022-03-14 ENCOUNTER — CARE COORDINATION (OUTPATIENT)
Dept: CARE COORDINATION | Age: 72
End: 2022-03-14

## 2022-03-14 NOTE — CARE COORDINATION
[address] and left voicemail regarding Dietitian follow up. Left call back number. RD spoke with patient for initial nutrition assessment on 1/17 and has been following up with patient. RD outreached today 3/14 and left . RD will continue to follow/assist with patient return call.        [facility], [address] [place] [address]

## 2022-03-16 DIAGNOSIS — I10 PRIMARY HYPERTENSION: Primary | ICD-10-CM

## 2022-03-16 RX ORDER — LOSARTAN POTASSIUM 100 MG/1
100 TABLET ORAL DAILY
Qty: 30 TABLET | Refills: 5 | Status: SHIPPED | OUTPATIENT
Start: 2022-03-16

## 2022-05-05 ENCOUNTER — OFFICE VISIT (OUTPATIENT)
Dept: CARDIOLOGY CLINIC | Age: 72
End: 2022-05-05
Payer: MEDICARE

## 2022-05-05 VITALS
HEIGHT: 60 IN | HEART RATE: 81 BPM | DIASTOLIC BLOOD PRESSURE: 72 MMHG | BODY MASS INDEX: 32 KG/M2 | OXYGEN SATURATION: 98 % | SYSTOLIC BLOOD PRESSURE: 126 MMHG | RESPIRATION RATE: 16 BRPM | WEIGHT: 163 LBS

## 2022-05-05 DIAGNOSIS — E66.9 OBESITY, CLASS I, BMI 30-34.9: ICD-10-CM

## 2022-05-05 DIAGNOSIS — I25.2 HISTORY OF ST ELEVATION MYOCARDIAL INFARCTION (STEMI): Primary | ICD-10-CM

## 2022-05-05 DIAGNOSIS — I10 ESSENTIAL HYPERTENSION, BENIGN: ICD-10-CM

## 2022-05-05 DIAGNOSIS — E78.2 MIXED HYPERLIPIDEMIA: ICD-10-CM

## 2022-05-05 DIAGNOSIS — I65.23 BILATERAL CAROTID ARTERY STENOSIS: ICD-10-CM

## 2022-05-05 DIAGNOSIS — I10 PRIMARY HYPERTENSION: ICD-10-CM

## 2022-05-05 PROCEDURE — 1123F ACP DISCUSS/DSCN MKR DOCD: CPT | Performed by: INTERNAL MEDICINE

## 2022-05-05 PROCEDURE — 4040F PNEUMOC VAC/ADMIN/RCVD: CPT | Performed by: INTERNAL MEDICINE

## 2022-05-05 PROCEDURE — 1036F TOBACCO NON-USER: CPT | Performed by: INTERNAL MEDICINE

## 2022-05-05 PROCEDURE — 99214 OFFICE O/P EST MOD 30 MIN: CPT | Performed by: INTERNAL MEDICINE

## 2022-05-05 PROCEDURE — G8417 CALC BMI ABV UP PARAM F/U: HCPCS | Performed by: INTERNAL MEDICINE

## 2022-05-05 PROCEDURE — 1090F PRES/ABSN URINE INCON ASSESS: CPT | Performed by: INTERNAL MEDICINE

## 2022-05-05 PROCEDURE — G8427 DOCREV CUR MEDS BY ELIG CLIN: HCPCS | Performed by: INTERNAL MEDICINE

## 2022-05-05 PROCEDURE — G8400 PT W/DXA NO RESULTS DOC: HCPCS | Performed by: INTERNAL MEDICINE

## 2022-05-05 PROCEDURE — 3017F COLORECTAL CA SCREEN DOC REV: CPT | Performed by: INTERNAL MEDICINE

## 2022-05-05 RX ORDER — FLUVOXAMINE MALEATE 25 MG
25 TABLET ORAL NIGHTLY
COMMUNITY
Start: 2022-03-31 | End: 2023-03-31

## 2022-05-05 RX ORDER — CLONAZEPAM 0.5 MG/1
0.5 TABLET ORAL DAILY
COMMUNITY
Start: 2022-04-21 | End: 2022-05-21

## 2022-05-05 RX ORDER — BUSPIRONE HYDROCHLORIDE 5 MG/1
TABLET ORAL
COMMUNITY
Start: 2022-02-14 | End: 2022-08-31

## 2022-05-05 RX ORDER — CLONIDINE HYDROCHLORIDE 0.1 MG/1
0.1 TABLET ORAL 2 TIMES DAILY
COMMUNITY
Start: 2022-05-02 | End: 2022-06-01

## 2022-05-05 NOTE — PROGRESS NOTES
Chief Complaint   Patient presents with    Follow-up     Endarterectomy 03/22/22    Shortness of Breath     ORTIZ - walking             1-12-22: Patient is a 66 y.o. female who presents with a chief complaint of CP. Patient is followed on a regular basis by Dr. Remedios Strong MD.  Patient presented with very typical anginal symptoms and noted to have ST elevation MI on the EKG and code purple was activated. Patient with history of hypertension hyperlipidemia and carotid artery disease status post recent left carotid endarterectomy. She denies history of diabetes or tobacco use. She quit in the 1990s. 1-27-22: Patient presents for initial medical evaluation. Patient is followed on a regular basis by Dr. Remedios Strong MD. S/p STEMI and cath with 99% prox RCA with YOLIS, 50% mid LAD, mild CX disease, normal LVF. On DAPT and Brilinta is expensive. Developed dizziness today . She went to ER and BP was elevated in 200's. 180's in the office. Pt denies chest pain, dyspnea, dyspnea on exertion, change in exercise capacity, fatigue,  nausea, vomiting, diarrhea, constipation, motor weakness, insomnia, weight loss, synco, palpitations, PND, orthopnea, or claudication. Hx of Carotid disease s/p left CEA, s/p CTA of neck today with at least  75% NIURKA  EKG with T wave inversion in inferior wall. Likely from recent STEMI of RCA. 5-5-22: s/p right internal carotid artery endarterectomy at [facility] with Dr. Humberto Simpson. History of ST elevation MI status post PCI of the proximal LAD with drug-eluting stent, noted to have 50% mid LAD stenosis, mild circumflex disease and normal LV function. Patient with history of left carotid endarterectomy in the past.  States she is fatigued now. States her BP has been elevated.    Pt denies chest pain, dyspnea, dyspnea on exertion, change in exercise capacity,nausea, vomiting, diarrhea, constipation, motor weakness, insomnia, weight loss, syncope, dizziness, lightheadedness, palpitations, PND, orthopnea, or claudication.           Patient Active Problem List   Diagnosis    Hyperlipidemia    Hypertension    GERD (gastroesophageal reflux disease)    Breast cancer ([address]    Fat necrosis    ST elevation myocardial infarction involving right coronary artery (HCC)    Bilateral carotid artery stenosis    Dizziness    Essential hypertension, benign    Obesity, Class I, BMI 30-34.9    History of ST elevation myocardial infarction (STEMI)       Past Surgical History:   Procedure Laterality Date    BREAST BIOPSY Left     benign    BREAST BIOPSY Right     rash     BREAST BIOPSY Right 2013    Right breast biopsy with biopsy of right breast skin    BREAST SURGERY Right 2013    Right simple mastectomy with SNB    CAROTID ENDARTERECTOMY Left     CHOLECYSTECTOMY  2000    CORONARY ANGIOPLASTY      2022    CORONARY ANGIOPLASTY WITH STENT PLACEMENT  2022    DIAGNOSTIC CARDIAC CATH LAB PROCEDURE  2022    MASTECTOMY Right 2013    with axillary sentinel lymph node Bx     OTHER SURGICAL HISTORY  11/10/2014    MULT MASSES EXC CHEST WALL       Social History     Socioeconomic History    Marital status:      Spouse name: None    Number of children: None    Years of education: None    Highest education level: None   Occupational History    None   Tobacco Use    Smoking status: Former Smoker     Packs/day: 1.00     Years: 30.00     Pack years: 30.00     Types: Cigarettes     Quit date: 1996     Years since quittin.3    Smokeless tobacco: Never Used   Vaping Use    Vaping Use: Never used   Substance and Sexual Activity    Alcohol use: Never    Drug use: Never    Sexual activity: Not Currently   Other Topics Concern    None   Social History Narrative    None     Social Determinants of Health     Financial Resource Strain:     Difficulty of Paying Living Expenses: Not on file   Food Insecurity:     Worried About Running Out of Food in the Last Year: Not on file    Ran Out of Food in the Last Year: Not on file   Transportation Needs:     Lack of Transportation (Medical): Not on file    Lack of Transportation (Non-Medical): Not on file   Physical Activity:     Days of Exercise per Week: Not on file    Minutes of Exercise per Session: Not on file   Stress:     Feeling of Stress : Not on file   Social Connections:     Frequency of Communication with Friends and Family: Not on file    Frequency of Social Gatherings with Friends and Family: Not on file    Attends Yarsanism Services: Not on file    Active Member of [address] [place] [address] [employer] or Organizations: Not on file    Attends Club or Organization Meetings: Not on file    Marital Status: Not on file   Intimate Partner Violence:     Fear of Current or Ex-Partner: Not on file    Emotionally Abused: Not on file    Physically Abused: Not on file    Sexually Abused: Not on file   Housing Stability:     Unable to Pay for Housing in the Last Year: Not on file    Number of [place] in the Last Year: Not on file    Unstable Housing in the Last Year: Not on file       Family History   Problem Relation Age of Onset    Cancer Mother         Melanoma       Current Outpatient Medications   Medication Sig Dispense Refill    clonazePAM (KLONOPIN) 0.5 MG tablet Take 0.5 mg by mouth daily.       cloNIDine (CATAPRES) 0.1 MG tablet Take 0.1 mg by mouth 2 times daily      fluvoxaMINE (LUVOX) 25 MG tablet Take 25 mg by mouth nightly      busPIRone (BUSPAR) 5 MG tablet TAKE 1 TABLET BY MOUTH THREE TIMES DAILY      losartan (COZAAR) 100 MG tablet Take 1 tablet by mouth daily 30 tablet 5    amLODIPine (NORVASC) 10 MG tablet Take 1 tablet by mouth daily 90 tablet 1    carvedilol (COREG) 12.5 MG tablet Take 1 tablet by mouth 2 times daily 180 tablet 1    clopidogrel (PLAVIX) 75 MG tablet Take 1 tablet by mouth daily 90 tablet 3    pravastatin (PRAVACHOL) 80 MG tablet Take 80 mg by mouth daily      aspirin 81 MG EC tablet Take 1 tablet by mouth daily 30 tablet 3    fluticasone (FLONASE) 50 MCG/ACT nasal spray 1 spray by Nasal route daily       pantoprazole (PROTONIX) 40 MG tablet Take 40 mg by mouth daily        No current facility-administered medications for this visit. Amoxicillin    Review of Systems:  General ROS: negative  Psychological ROS: negative  Hematological and Lymphatic ROS: No history of blood clots or bleeding disorder. Respiratory ROS: no cough, shortness of breath, or wheezing  Cardiovascular ROS: no chest pain or dyspnea on exertion  Gastrointestinal ROS: no abdominal pain, change in bowel habits, or black or bloody stools  Genito-Urinary ROS: no dysuria, trouble voiding, or hematuria  Musculoskeletal ROS: negative  Neurological ROS: positive for - dizziness  Dermatological ROS: negative    VITALS:  Pulse 81, resp. rate 16, height 5' (1.524 m), weight 163 lb (73.9 kg), SpO2 98 %. Body mass index is 31.83 kg/m². Physical Examination:  General appearance - alert, well appearing, and in no distress  Mental status - alert, oriented to person, place, and time  Neck - Neck is supple, no JVD or carotid bruits. No thyromegaly or adenopathy. Chest - clear to auscultation, no wheezes, rales or rhonchi, symmetric air entry  Heart - normal rate, regular rhythm, normal S1, S2, no murmurs, rubs, clicks or gallops  Abdomen - soft, nontender, nondistended, no masses or organomegaly  Neurological - alert, oriented, normal speech, no focal findings or movement disorder noted  Extremities - peripheral pulses normal, no pedal edema, no clubbing or cyanosis  Skin - normal coloration and turgor, no rashes, no suspicious skin lesions noted    EKG: normal sinus rhythm    No orders of the defined types were placed in this encounter. ASSESSMENT:     Diagnosis Orders   1. History of ST elevation myocardial infarction (STEMI)     2. Essential hypertension, benign     3. Bilateral carotid artery stenosis     4. Mixed hyperlipidemia     5. Primary hypertension     6. Obesity, Class I, BMI 30-34.9           PLAN:         As always, aggressive risk factor modification is strongly recommended. We should adhere to the JNC VIII guidelines for HTN management and the NCEP ATP III guidelines for LDL-C management. Cardiac diet is always recommended with low fat, cholesterol, calories and sodium. Continue medications at current doses. Follow up with PCP for BP control since he has adjusted medications recently. Don't want to confuse patient with many BP med changes. Cont with DAPT. Follow up with vascular surgery for carotid follow up/surveillance. Check EKG    Patient was advised and encouraged to check blood pressure at home or at a pharmacy, maintain a logbook, and also call us back if blood pressure are above the target ranges or if it is low. Patient clearly understands and agrees to the instructions. We will need to continue to monitor muscle and liver enzymes, BUN, CR, and electrolytes.

## 2022-08-31 ENCOUNTER — OFFICE VISIT (OUTPATIENT)
Dept: SURGERY | Age: 72
End: 2022-08-31
Payer: MEDICARE

## 2022-08-31 VITALS
SYSTOLIC BLOOD PRESSURE: 138 MMHG | DIASTOLIC BLOOD PRESSURE: 62 MMHG | BODY MASS INDEX: 30.82 KG/M2 | RESPIRATION RATE: 12 BRPM | HEART RATE: 72 BPM | OXYGEN SATURATION: 99 % | WEIGHT: 157 LBS | HEIGHT: 60 IN

## 2022-08-31 DIAGNOSIS — E66.9 OBESITY, CLASS I, BMI 30-34.9: ICD-10-CM

## 2022-08-31 DIAGNOSIS — R22.2 MASS OF CHEST WALL, RIGHT: Primary | ICD-10-CM

## 2022-08-31 DIAGNOSIS — R22.2 MASS OF CHEST WALL, RIGHT: ICD-10-CM

## 2022-08-31 DIAGNOSIS — Z85.3 PERSONAL HISTORY OF MALIGNANT NEOPLASM OF BREAST: ICD-10-CM

## 2022-08-31 PROCEDURE — G8417 CALC BMI ABV UP PARAM F/U: HCPCS | Performed by: SURGERY

## 2022-08-31 PROCEDURE — 1036F TOBACCO NON-USER: CPT | Performed by: SURGERY

## 2022-08-31 PROCEDURE — G8400 PT W/DXA NO RESULTS DOC: HCPCS | Performed by: SURGERY

## 2022-08-31 PROCEDURE — 19120 REMOVAL OF BREAST LESION: CPT | Performed by: SURGERY

## 2022-08-31 PROCEDURE — 1123F ACP DISCUSS/DSCN MKR DOCD: CPT | Performed by: SURGERY

## 2022-08-31 PROCEDURE — G8427 DOCREV CUR MEDS BY ELIG CLIN: HCPCS | Performed by: SURGERY

## 2022-08-31 PROCEDURE — 1090F PRES/ABSN URINE INCON ASSESS: CPT | Performed by: SURGERY

## 2022-08-31 PROCEDURE — 3017F COLORECTAL CA SCREEN DOC REV: CPT | Performed by: SURGERY

## 2022-08-31 PROCEDURE — 99203 OFFICE O/P NEW LOW 30 MIN: CPT | Performed by: SURGERY

## 2022-08-31 RX ORDER — FLUVOXAMINE MALEATE 50 MG/1
1 TABLET, COATED ORAL NIGHTLY
COMMUNITY
Start: 2022-06-28

## 2022-08-31 RX ORDER — LIDOCAINE HYDROCHLORIDE AND EPINEPHRINE 15; 5 MG/ML; UG/ML
8 INJECTION, SOLUTION EPIDURAL ONCE
Status: COMPLETED | OUTPATIENT
Start: 2022-08-31 | End: 2022-08-31

## 2022-08-31 RX ADMIN — LIDOCAINE HYDROCHLORIDE AND EPINEPHRINE 8 ML: 15; 5 INJECTION, SOLUTION EPIDURAL at 12:02

## 2022-08-31 RX ADMIN — Medication 2 MEQ: at 12:02

## 2022-08-31 ASSESSMENT — ENCOUNTER SYMPTOMS
SHORTNESS OF BREATH: 0
COUGH: 0
SORE THROAT: 0
CHEST TIGHTNESS: 0
NAUSEA: 0
COLOR CHANGE: 0
VOMITING: 0
ABDOMINAL PAIN: 0

## 2022-08-31 NOTE — PROGRESS NOTES
NEW BREAST PATIENT         SERVICE DATE: 8/31/22  SERVICE TIME:  11:48 AM EDT    REFERRED BY:  Lisa Zuleta MD  REASON FOR TODAY'S VISIT:    Chief Complaint   Patient presents with    New Patient     Right chest wall lesion that she first noticed a few weeks ago,denies pain, its only irritating if she wears her right breast prosthesis, HX Right Breast Mastectomy and SLNB 2/27/2017 followed by chemo and radiation      CHAPERONE WAS OFFERED, PATIENT RESPONDED: no    HISTORY AND CHIEF COMPLAINT:  Mone Whelan is a 74 y.o.  female who is here for a New Patient (Right chest wall lesion that she first noticed a few weeks ago,denies pain, its only irritating if she wears her right breast prosthesis, HX Right Breast Mastectomy and SLNB 2/27/2017 followed by chemo and radiation)  She said it stayed the same size  No fever or chills  No injury      BREAST HISTORY  Her past breast history (prior to this encounter) is as follows:   Abnormal mammogram:   No  Abnormal Breast US:  No  Breast biopsy:    Yes, Right Breast and right chest wall  Breast cysts:    No  Breast surgery:    Yes, Right Breast Mastectomy and SLNB 2/27/2013  Breast cancer              Yes, 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  History of ovarian cancer: no  Ashkenazi Ancestry: no  Age at the birth of first child: N/A  Age at the onset of menses: 10  Age at menopause: mid 52's   Hormonal therapy: no  Postmenopausal obesity: yes, BMI 30.66    BRA SIZE: 36C    Past Medical History:   Diagnosis Date    Anxiety     Breast [facility] ([facility]     Depression     Gallbladder disease     cholecystitis    GERD (gastroesophageal reflux disease)     Heart attack ([facility]     01-    History of ST elevation myocardial infarction (STEMI) 2022    Hypercholesterolemia     Hyperlipidemia     Hypertension     TIA (transient ischemic attack)      Past Surgical History:   Procedure Laterality Date    BREAST BIOPSY Left     benign    BREAST BIOPSY Right 2004    rash     BREAST BIOPSY Right 2013    Right breast biopsy with biopsy of right breast skin    BREAST SURGERY Right 2013    Right simple mastectomy with SNB    CAROTID ENDARTERECTOMY Left     CHOLECYSTECTOMY      CORONARY ANGIOPLASTY      2022    CORONARY ANGIOPLASTY WITH STENT PLACEMENT  2022    DIAGNOSTIC CARDIAC CATH LAB PROCEDURE  2022    MASTECTOMY Right 2013    with axillary sentinel lymph node Bx     OTHER SURGICAL HISTORY  11/10/2014    MULT MASSES EXC CHEST WALL     Family History   Problem Relation Age of Onset    Cancer Mother         Melanoma     Social History     Socioeconomic History    Marital status:      Spouse name: Not on file    Number of children: Not on file    Years of education: Not on file    Highest education level: Not on file   Occupational History    Not on file   Tobacco Use    Smoking status: Former     Packs/day: 1.00     Years: 30.00     Pack years: 30.00     Types: Cigarettes     Quit date: 1996     Years since quittin.6    Smokeless tobacco: Never   Vaping Use    Vaping Use: Never used   Substance and Sexual Activity    Alcohol use: Never    Drug use: Never    Sexual activity: Not Currently   Other Topics Concern    Not on file   Social History Narrative    Not on file     Social Determinants of Health     Financial Resource Strain: Not on file   Food Insecurity: Not on file   Transportation Needs: Not on file   Physical Activity: Not on file   Stress: Not on file   Social Connections: Not on file   Intimate Partner Violence: Not on file   Housing Stability: Not on file       Review of Systems   Constitutional:  Negative for activity change, appetite change, chills, diaphoresis, fatigue, fever and unexpected weight change. HENT:  Negative for congestion, ear pain, hearing loss, mouth sores, nosebleeds and sore throat. Respiratory:  Negative for cough, chest tightness and shortness of breath. Cardiovascular:  Negative for chest pain, palpitations and leg swelling. Gastrointestinal:  Negative for abdominal pain, nausea and vomiting. Endocrine: Negative for cold intolerance, heat intolerance, polydipsia, polyphagia and polyuria. Genitourinary:  Negative for difficulty urinating, menstrual problem and vaginal bleeding. Musculoskeletal:  Negative for neck pain and neck stiffness. Skin:  Negative for color change, pallor, rash and wound. Allergic/Immunologic: Negative for environmental allergies and immunocompromised state. Neurological:  Negative for weakness. Hematological:  Does not bruise/bleed easily. Psychiatric/Behavioral:  Negative for agitation, confusion, sleep disturbance and suicidal ideas. The patient is not nervous/anxious. Have you ever tested positive for AIDS? no  Have you ever tested positive for Hepatitis? no    ANTICOAGULANT MEDICATIONS:  Plavix, ASA    SOCIAL HISTORY   Marital status:   Occupation:  Retired  in [employer]  Tobacco use: [place]  reports that she quit smoking about 26 years ago. Her smoking use included cigarettes. She has a 30.00 pack-year smoking history. She has never used smokeless tobacco.  Alcohol use: [place]  reports no history of alcohol use. Drug use: [place]  reports no history of drug use. Caffeine intake: 3 cups of caffeinated coffee per day(s)  Exercise:  moderately active, walks    Vitals:    08/31/22 1144   BP: 138/62   Pulse: 72   Resp: 12   SpO2: 99%   Weight: 157 lb (71.2 kg)   Height: 5' (1.524 m)        Physical Exam  Vitals reviewed. Constitutional:       Appearance: Normal appearance. She is well-developed. HENT:      Head: Normocephalic and atraumatic.       Nose: Nose normal. Eyes:      Conjunctiva/sclera: Conjunctivae normal.      Right eye: No hemorrhage. Left eye: No hemorrhage. Cardiovascular:      Rate and Rhythm: Normal rate. Pulmonary:      Effort: Pulmonary effort is normal. No respiratory distress. Chest:   Breasts:     Breasts are asymmetrical.      Right: Mass (0.5 cm lesion in the skin non mobile, smooth) present. No inverted nipple, nipple discharge, skin change, tenderness, axillary adenopathy or supraclavicular adenopathy. Left: No inverted nipple, mass, nipple discharge, skin change, tenderness, axillary adenopathy or supraclavicular adenopathy. Musculoskeletal:         General: Normal range of motion. Cervical back: Normal range of motion and neck supple. Comments: Normal Range of motion in upper and lower extremities. Lymphadenopathy:      Cervical: No cervical adenopathy. Right cervical: No superficial, deep or posterior cervical adenopathy. Left cervical: No superficial, deep or posterior cervical adenopathy. Upper Body:      Right upper body: No supraclavicular, axillary or pectoral adenopathy. Left upper body: No supraclavicular, axillary or pectoral adenopathy. Skin:     General: Skin is warm and dry. Findings: No abrasion, bruising, erythema or lesion. Neurological:      Mental Status: She is alert and oriented to person, place, and time. She is not disoriented. Psychiatric:         Speech: Speech normal.         Behavior: Behavior normal. Behavior is cooperative. Thought Content: Thought content normal.         Judgment: Judgment normal.     RADIOGRAPHIC FINDINGS:     In epic    ASSESSMENT         IMPRESSION :      ICD-10-CM    1. Mass of chest wall, right  R22.2 Lidocaine-EPINEPHrine 1.5 %-1:827320 8 mL     sodium bicarbonate 8.4 % injection 2 mEq     Surgical Pathology      2. Personal history of malignant neoplasm of breast  Z85.3       3.  Obesity, Class I, BMI 30-34.9  E66.9 PLAN:    Recommend surgical excision. The procedure was explained to the patient in detail including the risks of bleeding and infection. Patient declined genetic testing         OPERATIVE REPORT    Surgery Date: 8/31/2022   Incision/Procedure Start Time: 1210  Incision Close/Procedure End Time: 1214     Procedure: right chest wall excisional biopsy    Surgeon(s)/Proceduralist(s) and Assistant(s): Trang Vasquez MD       Anesthesia: local 4 cc of lidocaine with epi/ bicarb    Pre-Operative Diagnosis: right chest wall mass, personal history of triple negative breast cancer  Post-Operative Diagnosis: SAME    ESTIMATED BLOOD LOSS: Minimal, 1cc     COMPLICATIONS: No complications. FINDINGS: small skin nodule     SPECIMEN: right chest wall mass    DRAINS: No drains were placed. INDICATIONS:  Buddy Rose is a 74 y.o.   female  who presented with a right chest wall mass. I recommended an excisional biopsy because location. She understood the risks and benefits of the procedure and consented on the day of surgery. PROCEDURE:  The right  breast, chest and axilla were prepped and draped in usual surgical fashion. An elliptical roberth was made over the mass. It was palpable. It was infiltrated with 4  cc of 1% lidocaine with epi and bicarb. A 15 scalpel was used through skin and subcutaneous tissue. Superior, medial, inferior, lateral flap, posterior flap were   created around the mass and removed and placed in formalin. Wound bed was irrigated, inspected for hemostasis, closed with interrupted skin affix. The incision was covered by Steri-Strips, 4 x 4's, paper tape.  The patient tolerated the procedure well and discharge instructions were given         SIGNATURE: Trang Vasquez MD PATIENT NAME: Lakeisha Nealt: 8/31/22 MRN: [id number]   TIME: 12:13 PM EDT PHONE: [phone]        Orders Placed This Encounter   Procedures    Surgical Pathology     Standing Status:   Future Standing Expiration Date:   8/30/2023     Order Specific Question:   PREVIOUS BIOPSY     Answer:   Yes     Comments:   triple negative invasive ductal carcinoma 2013     Order Specific Question:   PREOP DIAGNOSIS     Answer:   chest wall mass     Order Specific Question:   FROZEN SECTION - NO OR YES/SPECIMEN     Answer:   No     Order Specific Question:   Is specimen a breast biopsy? Answer: Yes    Amb External Referral To Breast Clinic      Orders Placed This Encounter   Medications    Lidocaine-EPINEPHrine 1.5 %-1:262924 8 mL    sodium bicarbonate 8.4 % injection 2 mEq        No follow-ups on file. I spent a total of 40 minutes on the date of the service which included preparing to see the patient, face-to-face patient care, completing clinical documentation, obtaining and/or reviewing separately obtained history, performing a medically appropriate examination, counseling and educating the patient/family/caregiver, ordering medications, tests, or procedures, communicating with other HCPs (not separately reported), independently interpreting results (not separately reported), communicating results to the patient/family/caregiver and care coordination (not separately reported). Discussion regarding natural history and potential progression of breast changes, timing of surveillance visits, timing and type of surveillance imaging, life-style modification, exercise, and limiting alcohol intake were performed today. I personally and independently saw and examined patient and reviewed all pertinent laboratory data and imaging studies. I have reviewed and agree with the history and review of systems as documented in the above note. This document is generated, in part, by voice recognition software and thus syntax and grammatical errors are possible.     Johnson Cooper MD    CC: Saida Hamilton MD, Ashely Boateng MD    The chief complaint, extensive medical and family history, and review of systems were asked and reviewed with the patient by me. I also reviewed the note in detail. This note was partially generated using Dragon voice recognition system, and there may be some incorrect words, spellings, punctuation that were not noticed in checking the note before saving.

## 2022-09-06 ENCOUNTER — TELEPHONE (OUTPATIENT)
Dept: SURGERY | Age: 72
End: 2022-09-06

## 2022-09-06 NOTE — TELEPHONE ENCOUNTER
I was calling the patient with right chest wall excisional biopsy results. LMOM for the patient to return a call to the office.

## 2022-09-06 NOTE — TELEPHONE ENCOUNTER
I called the patient and notified her that her Right chest wall excisional biopsy pathology results are benign and are concordant with Dr. Rodger Thomas findings. The patient verbalized understanding of her test results. The patient educated on the importance of: Follow up with Dr. Preet Lopez as scheduled  Annual screening mammogram of the left breast ordered by your primary care physician  An active lifestyle, healthy diet, limited alcohol intake, achieve and maintain a healthy BMI to optimize breast cancer outcomes. 5.Return to Dr. Kayode Alberto if you have any new left breast shape, left nipple discharge, or left breast lump or changes in the right chest wall/axilla    The patient verbalized understanding and has no questions or concerns at this time.